# Patient Record
Sex: FEMALE | Race: BLACK OR AFRICAN AMERICAN | NOT HISPANIC OR LATINO | Employment: FULL TIME | RURAL
[De-identification: names, ages, dates, MRNs, and addresses within clinical notes are randomized per-mention and may not be internally consistent; named-entity substitution may affect disease eponyms.]

---

## 2018-07-03 ENCOUNTER — HISTORICAL (OUTPATIENT)
Dept: ADMINISTRATIVE | Facility: HOSPITAL | Age: 28
End: 2018-07-03

## 2018-07-06 LAB
LAB AP COMMENTS: NORMAL
LAB AP GENERAL CAT - HISTORICAL: NORMAL
LAB AP INTERPRETATION/RESULT - HISTORICAL: NORMAL
LAB AP SPECIMEN ADEQUACY - HISTORICAL: NORMAL
LAB AP SPECIMEN SUBMITTED - HISTORICAL: NORMAL

## 2020-12-04 ENCOUNTER — HISTORICAL (OUTPATIENT)
Dept: ADMINISTRATIVE | Facility: HOSPITAL | Age: 30
End: 2020-12-04

## 2020-12-04 LAB
HBV SURFACE AB SER-ACNC: REACTIVE M[IU]/ML
HBV SURFACE AG SERPL QL IA: NORMAL

## 2020-12-09 LAB
VARICELLA ZOSTER, BLOOD: POSITIVE
VZV IGG INDEX: >4 (ref 0–0.9)

## 2021-08-26 ENCOUNTER — HOSPITAL ENCOUNTER (EMERGENCY)
Facility: HOSPITAL | Age: 31
Discharge: HOME | End: 2021-08-26

## 2021-08-26 VITALS
DIASTOLIC BLOOD PRESSURE: 83 MMHG | TEMPERATURE: 99 F | OXYGEN SATURATION: 100 % | SYSTOLIC BLOOD PRESSURE: 164 MMHG | WEIGHT: 181 LBS | HEIGHT: 64 IN | HEART RATE: 88 BPM | BODY MASS INDEX: 30.9 KG/M2 | RESPIRATION RATE: 16 BRPM

## 2021-08-26 DIAGNOSIS — B37.0 THRUSH, ORAL: Primary | ICD-10-CM

## 2021-08-26 PROCEDURE — 99283 EMERGENCY DEPT VISIT LOW MDM: CPT

## 2021-08-26 PROCEDURE — 99283 PR EMERGENCY DEPT VISIT,LEVEL III: ICD-10-PCS | Mod: ,,, | Performed by: REGISTERED NURSE

## 2021-08-26 PROCEDURE — 99283 EMERGENCY DEPT VISIT LOW MDM: CPT | Mod: ,,, | Performed by: REGISTERED NURSE

## 2021-08-26 RX ORDER — NYSTATIN 100000 [USP'U]/ML
500000 SUSPENSION ORAL 4 TIMES DAILY
Qty: 200 ML | Refills: 0 | Status: SHIPPED | OUTPATIENT
Start: 2021-08-26 | End: 2021-09-05

## 2021-09-21 ENCOUNTER — OFFICE VISIT (OUTPATIENT)
Dept: FAMILY MEDICINE | Facility: CLINIC | Age: 31
End: 2021-09-21
Payer: COMMERCIAL

## 2021-09-21 VITALS
SYSTOLIC BLOOD PRESSURE: 120 MMHG | OXYGEN SATURATION: 98 % | HEIGHT: 64 IN | WEIGHT: 183 LBS | RESPIRATION RATE: 16 BRPM | HEART RATE: 94 BPM | TEMPERATURE: 99 F | BODY MASS INDEX: 31.24 KG/M2 | DIASTOLIC BLOOD PRESSURE: 74 MMHG

## 2021-09-21 DIAGNOSIS — M25.50 ARTHRALGIA, UNSPECIFIED JOINT: ICD-10-CM

## 2021-09-21 DIAGNOSIS — R51.9 NONINTRACTABLE HEADACHE, UNSPECIFIED CHRONICITY PATTERN, UNSPECIFIED HEADACHE TYPE: Primary | ICD-10-CM

## 2021-09-21 LAB
CRP SERPL-MCNC: <0.29 MG/DL (ref 0–0.8)
ERYTHROCYTE [SEDIMENTATION RATE] IN BLOOD BY WESTERGREN METHOD: 20 MM/HR (ref 0–20)
RHEUMATOID FACT SER NEPH-ACNC: NEGATIVE [IU]/ML

## 2021-09-21 PROCEDURE — 85651 RBC SED RATE NONAUTOMATED: CPT | Mod: ,,, | Performed by: CLINICAL MEDICAL LABORATORY

## 2021-09-21 PROCEDURE — 86038 ANA EIA W/REFLEX DSDNA/ENA: ICD-10-PCS | Mod: ,,, | Performed by: CLINICAL MEDICAL LABORATORY

## 2021-09-21 PROCEDURE — 86038 ANTINUCLEAR ANTIBODIES: CPT | Mod: ,,, | Performed by: CLINICAL MEDICAL LABORATORY

## 2021-09-21 PROCEDURE — 86430 RHEUMATOID FACTOR TEST QUAL: CPT | Mod: ,,, | Performed by: CLINICAL MEDICAL LABORATORY

## 2021-09-21 PROCEDURE — 86140 C-REACTIVE PROTEIN: CPT | Mod: ,,, | Performed by: CLINICAL MEDICAL LABORATORY

## 2021-09-21 PROCEDURE — 86430 RHEUMATOID FACTOR SCREEN: ICD-10-PCS | Mod: ,,, | Performed by: CLINICAL MEDICAL LABORATORY

## 2021-09-21 PROCEDURE — 99204 OFFICE O/P NEW MOD 45 MIN: CPT | Mod: ,,, | Performed by: FAMILY MEDICINE

## 2021-09-21 PROCEDURE — 86200 CYCLIC CITRUL PEPTIDE ANTIBODY, IGG: ICD-10-PCS | Mod: ,,, | Performed by: CLINICAL MEDICAL LABORATORY

## 2021-09-21 PROCEDURE — 86200 CCP ANTIBODY: CPT | Mod: ,,, | Performed by: CLINICAL MEDICAL LABORATORY

## 2021-09-21 PROCEDURE — 86140 C-REACTIVE PROTEIN: ICD-10-PCS | Mod: ,,, | Performed by: CLINICAL MEDICAL LABORATORY

## 2021-09-21 PROCEDURE — 99204 PR OFFICE/OUTPT VISIT, NEW, LEVL IV, 45-59 MIN: ICD-10-PCS | Mod: ,,, | Performed by: FAMILY MEDICINE

## 2021-09-21 PROCEDURE — 85651 SEDIMENTATION RATE, AUTOMATED: ICD-10-PCS | Mod: ,,, | Performed by: CLINICAL MEDICAL LABORATORY

## 2021-09-21 RX ORDER — RIZATRIPTAN BENZOATE 5 MG/1
5 TABLET ORAL
Qty: 6 TABLET | Refills: 0 | Status: SHIPPED | OUTPATIENT
Start: 2021-09-21 | End: 2021-10-27 | Stop reason: SDUPTHER

## 2021-09-23 LAB
ANA SER QL: NEGATIVE
CCP AB SER IA-ACNC: <16 UNITS

## 2021-10-27 ENCOUNTER — OFFICE VISIT (OUTPATIENT)
Dept: FAMILY MEDICINE | Facility: CLINIC | Age: 31
End: 2021-10-27
Payer: COMMERCIAL

## 2021-10-27 VITALS
SYSTOLIC BLOOD PRESSURE: 118 MMHG | WEIGHT: 181.5 LBS | DIASTOLIC BLOOD PRESSURE: 72 MMHG | HEIGHT: 64 IN | OXYGEN SATURATION: 99 % | BODY MASS INDEX: 30.99 KG/M2 | HEART RATE: 98 BPM

## 2021-10-27 DIAGNOSIS — R51.9 NONINTRACTABLE HEADACHE, UNSPECIFIED CHRONICITY PATTERN, UNSPECIFIED HEADACHE TYPE: Primary | ICD-10-CM

## 2021-10-27 PROCEDURE — 99213 PR OFFICE/OUTPT VISIT, EST, LEVL III, 20-29 MIN: ICD-10-PCS | Mod: ,,, | Performed by: FAMILY MEDICINE

## 2021-10-27 PROCEDURE — 99213 OFFICE O/P EST LOW 20 MIN: CPT | Mod: ,,, | Performed by: FAMILY MEDICINE

## 2021-10-27 RX ORDER — RIZATRIPTAN BENZOATE 5 MG/1
5 TABLET ORAL
Qty: 9 TABLET | Refills: 0 | Status: SHIPPED | OUTPATIENT
Start: 2021-10-27 | End: 2022-01-31

## 2021-11-09 ENCOUNTER — OFFICE VISIT (OUTPATIENT)
Dept: FAMILY MEDICINE | Facility: CLINIC | Age: 31
End: 2021-11-09
Payer: COMMERCIAL

## 2021-11-09 VITALS
WEIGHT: 180 LBS | HEIGHT: 64 IN | OXYGEN SATURATION: 99 % | SYSTOLIC BLOOD PRESSURE: 118 MMHG | DIASTOLIC BLOOD PRESSURE: 74 MMHG | HEART RATE: 68 BPM | BODY MASS INDEX: 30.73 KG/M2

## 2021-11-09 DIAGNOSIS — Z71.3 NUTRITIONAL COUNSELING: ICD-10-CM

## 2021-11-09 DIAGNOSIS — Z71.82 EXERCISE COUNSELING: ICD-10-CM

## 2021-11-09 DIAGNOSIS — Z00.00 ENCOUNTER FOR WELL ADULT EXAM WITHOUT ABNORMAL FINDINGS: Primary | ICD-10-CM

## 2021-11-09 DIAGNOSIS — Z12.4 CERVICAL CANCER SCREENING: ICD-10-CM

## 2021-11-09 DIAGNOSIS — Z13.1 DIABETES MELLITUS SCREENING: ICD-10-CM

## 2021-11-09 DIAGNOSIS — Z13.220 SCREENING FOR HYPERLIPIDEMIA: ICD-10-CM

## 2021-11-09 LAB
CHOLEST SERPL-MCNC: 197 MG/DL (ref 0–200)
CHOLEST/HDLC SERPL: 3.2 {RATIO}
GLUCOSE SERPL-MCNC: 99 MG/DL (ref 74–106)
HDLC SERPL-MCNC: 62 MG/DL (ref 40–60)
LDLC SERPL CALC-MCNC: 120 MG/DL
LDLC/HDLC SERPL: 1.9 {RATIO}
NONHDLC SERPL-MCNC: 135 MG/DL
TRIGL SERPL-MCNC: 73 MG/DL (ref 35–150)
VLDLC SERPL-MCNC: 15 MG/DL

## 2021-11-09 PROCEDURE — 82947 GLUCOSE, FASTING: ICD-10-PCS | Mod: ,,, | Performed by: CLINICAL MEDICAL LABORATORY

## 2021-11-09 PROCEDURE — 82947 ASSAY GLUCOSE BLOOD QUANT: CPT | Mod: ,,, | Performed by: CLINICAL MEDICAL LABORATORY

## 2021-11-09 PROCEDURE — 80061 LIPID PANEL: ICD-10-PCS | Mod: GZ,,, | Performed by: CLINICAL MEDICAL LABORATORY

## 2021-11-09 PROCEDURE — 80061 LIPID PANEL: CPT | Mod: GZ,,, | Performed by: CLINICAL MEDICAL LABORATORY

## 2021-11-09 PROCEDURE — 99395 PREV VISIT EST AGE 18-39: CPT | Mod: ,,, | Performed by: FAMILY MEDICINE

## 2021-11-09 PROCEDURE — 99395 PR PREVENTIVE VISIT,EST,18-39: ICD-10-PCS | Mod: ,,, | Performed by: FAMILY MEDICINE

## 2021-11-12 ENCOUNTER — PATIENT MESSAGE (OUTPATIENT)
Dept: FAMILY MEDICINE | Facility: CLINIC | Age: 31
End: 2021-11-12
Payer: COMMERCIAL

## 2021-11-17 DIAGNOSIS — Z11.52 ENCOUNTER FOR SCREENING LABORATORY TESTING FOR COVID-19 VIRUS: Primary | ICD-10-CM

## 2021-12-03 ENCOUNTER — OFFICE VISIT (OUTPATIENT)
Dept: OBSTETRICS AND GYNECOLOGY | Facility: CLINIC | Age: 31
End: 2021-12-03
Payer: COMMERCIAL

## 2021-12-03 VITALS
WEIGHT: 180.81 LBS | HEIGHT: 64 IN | SYSTOLIC BLOOD PRESSURE: 143 MMHG | BODY MASS INDEX: 30.87 KG/M2 | DIASTOLIC BLOOD PRESSURE: 84 MMHG | HEART RATE: 90 BPM | RESPIRATION RATE: 18 BRPM | OXYGEN SATURATION: 97 % | TEMPERATURE: 98 F

## 2021-12-03 DIAGNOSIS — Z72.51 HIGH RISK SEXUAL BEHAVIOR, UNSPECIFIED TYPE: ICD-10-CM

## 2021-12-03 DIAGNOSIS — Z01.419 ENCOUNTER FOR ANNUAL ROUTINE GYNECOLOGICAL EXAMINATION: Primary | ICD-10-CM

## 2021-12-03 DIAGNOSIS — Z32.02 URINE PREGNANCY TEST NEGATIVE: ICD-10-CM

## 2021-12-03 DIAGNOSIS — Z30.42 SURVEILLANCE FOR DEPO-PROVERA CONTRACEPTION: ICD-10-CM

## 2021-12-03 LAB
B-HCG UR QL: NEGATIVE
CANDIDA SPECIES: NEGATIVE
CTP QC/QA: YES
GARDNERELLA: POSITIVE
TRICHOMONAS: NEGATIVE

## 2021-12-03 PROCEDURE — 87491 CHLMYD TRACH DNA AMP PROBE: CPT | Mod: ,,, | Performed by: CLINICAL MEDICAL LABORATORY

## 2021-12-03 PROCEDURE — 87480 CANDIDA DNA DIR PROBE: CPT | Mod: ,,, | Performed by: CLINICAL MEDICAL LABORATORY

## 2021-12-03 PROCEDURE — 87591 N.GONORRHOEAE DNA AMP PROB: CPT | Mod: ,,, | Performed by: CLINICAL MEDICAL LABORATORY

## 2021-12-03 PROCEDURE — 99395 PR PREVENTIVE VISIT,EST,18-39: ICD-10-PCS | Mod: 25,,, | Performed by: ADVANCED PRACTICE MIDWIFE

## 2021-12-03 PROCEDURE — 81025 URINE PREGNANCY TEST: CPT | Mod: QW,,, | Performed by: ADVANCED PRACTICE MIDWIFE

## 2021-12-03 PROCEDURE — 96372 PR INJECTION,THERAP/PROPH/DIAG2ST, IM OR SUBCUT: ICD-10-PCS | Mod: ,,, | Performed by: ADVANCED PRACTICE MIDWIFE

## 2021-12-03 PROCEDURE — 87624 HPV HI-RISK TYP POOLED RSLT: CPT | Mod: ,,, | Performed by: CLINICAL MEDICAL LABORATORY

## 2021-12-03 PROCEDURE — 87660 BACTERIAL VAGINOSIS: ICD-10-PCS | Mod: ,,, | Performed by: CLINICAL MEDICAL LABORATORY

## 2021-12-03 PROCEDURE — 87591 CHLAMYDIA/GONORRHOEAE(GC), PCR: ICD-10-PCS | Mod: ,,, | Performed by: CLINICAL MEDICAL LABORATORY

## 2021-12-03 PROCEDURE — 87510 GARDNER VAG DNA DIR PROBE: CPT | Mod: ,,, | Performed by: CLINICAL MEDICAL LABORATORY

## 2021-12-03 PROCEDURE — 87624 HUMAN PAPILLOMAVIRUS (HPV): ICD-10-PCS | Mod: ,,, | Performed by: CLINICAL MEDICAL LABORATORY

## 2021-12-03 PROCEDURE — 87660 TRICHOMONAS VAGIN DIR PROBE: CPT | Mod: ,,, | Performed by: CLINICAL MEDICAL LABORATORY

## 2021-12-03 PROCEDURE — 96372 THER/PROPH/DIAG INJ SC/IM: CPT | Mod: ,,, | Performed by: ADVANCED PRACTICE MIDWIFE

## 2021-12-03 PROCEDURE — 81025 POCT URINE PREGNANCY: ICD-10-PCS | Mod: QW,,, | Performed by: ADVANCED PRACTICE MIDWIFE

## 2021-12-03 PROCEDURE — 87510 BACTERIAL VAGINOSIS: ICD-10-PCS | Mod: ,,, | Performed by: CLINICAL MEDICAL LABORATORY

## 2021-12-03 PROCEDURE — 87491 CHLAMYDIA/GONORRHOEAE(GC), PCR: ICD-10-PCS | Mod: ,,, | Performed by: CLINICAL MEDICAL LABORATORY

## 2021-12-03 PROCEDURE — 87480 BACTERIAL VAGINOSIS: ICD-10-PCS | Mod: ,,, | Performed by: CLINICAL MEDICAL LABORATORY

## 2021-12-03 PROCEDURE — 88142 CYTOPATH C/V THIN LAYER: CPT | Mod: GCY | Performed by: ADVANCED PRACTICE MIDWIFE

## 2021-12-03 PROCEDURE — 99395 PREV VISIT EST AGE 18-39: CPT | Mod: 25,,, | Performed by: ADVANCED PRACTICE MIDWIFE

## 2021-12-03 RX ORDER — MEDROXYPROGESTERONE ACETATE 150 MG/ML
150 INJECTION, SUSPENSION INTRAMUSCULAR
Status: COMPLETED | OUTPATIENT
Start: 2021-12-03 | End: 2021-12-03

## 2021-12-03 RX ADMIN — MEDROXYPROGESTERONE ACETATE 150 MG: 150 INJECTION, SUSPENSION INTRAMUSCULAR at 10:12

## 2021-12-05 LAB
CHLAMYDIA BY PCR: NEGATIVE
N. GONORRHOEAE (GC) BY PCR: NEGATIVE

## 2021-12-06 ENCOUNTER — TELEPHONE (OUTPATIENT)
Dept: OBSTETRICS AND GYNECOLOGY | Facility: CLINIC | Age: 31
End: 2021-12-06
Payer: COMMERCIAL

## 2021-12-06 DIAGNOSIS — B96.89 BACTERIAL VAGINOSIS: Primary | ICD-10-CM

## 2021-12-06 DIAGNOSIS — N76.0 BACTERIAL VAGINOSIS: Primary | ICD-10-CM

## 2021-12-06 RX ORDER — METRONIDAZOLE 500 MG/1
500 TABLET ORAL 2 TIMES DAILY
Qty: 14 TABLET | Refills: 0 | Status: SHIPPED | OUTPATIENT
Start: 2021-12-06 | End: 2021-12-13

## 2021-12-08 LAB
GH SERPL-MCNC: NORMAL NG/ML
INSULIN SERPL-ACNC: NORMAL U[IU]/ML
LAB AP CLINICAL INFORMATION: NORMAL
LAB AP GYN INTERPRETATION: NEGATIVE
LAB AP PAP DISCLAIMER COMMENTS: NORMAL
RENIN PLAS-CCNC: NORMAL NG/ML/H

## 2021-12-09 LAB
HPV 16: NEGATIVE
HPV 18: NEGATIVE
HPV OTHER: NEGATIVE

## 2021-12-10 ENCOUNTER — TELEPHONE (OUTPATIENT)
Dept: OBSTETRICS AND GYNECOLOGY | Facility: CLINIC | Age: 31
End: 2021-12-10
Payer: COMMERCIAL

## 2021-12-15 ENCOUNTER — LAB VISIT (OUTPATIENT)
Dept: PRIMARY CARE CLINIC | Facility: CLINIC | Age: 31
End: 2021-12-15

## 2021-12-15 DIAGNOSIS — Z20.822 COVID-19 RULED OUT: Primary | ICD-10-CM

## 2021-12-15 LAB
FLUAV AG UPPER RESP QL IA.RAPID: NEGATIVE
FLUBV AG UPPER RESP QL IA.RAPID: POSITIVE
SARS-COV+SARS-COV-2 AG RESP QL IA.RAPID: NEGATIVE

## 2021-12-15 PROCEDURE — 87428 SARSCOV & INF VIR A&B AG IA: CPT | Performed by: NURSE PRACTITIONER

## 2022-01-31 ENCOUNTER — OFFICE VISIT (OUTPATIENT)
Dept: NEUROLOGY | Facility: CLINIC | Age: 32
End: 2022-01-31
Payer: COMMERCIAL

## 2022-01-31 VITALS
DIASTOLIC BLOOD PRESSURE: 68 MMHG | HEIGHT: 64 IN | BODY MASS INDEX: 30.73 KG/M2 | HEART RATE: 95 BPM | RESPIRATION RATE: 18 BRPM | SYSTOLIC BLOOD PRESSURE: 110 MMHG | OXYGEN SATURATION: 99 % | WEIGHT: 180 LBS

## 2022-01-31 DIAGNOSIS — R51.9 NONINTRACTABLE HEADACHE, UNSPECIFIED CHRONICITY PATTERN, UNSPECIFIED HEADACHE TYPE: ICD-10-CM

## 2022-01-31 DIAGNOSIS — G44.1 OTHER VASCULAR HEADACHE: Primary | ICD-10-CM

## 2022-01-31 DIAGNOSIS — E53.8 VITAMIN B12 DEFICIENCY: ICD-10-CM

## 2022-01-31 DIAGNOSIS — E55.9 VITAMIN D DEFICIENCY: ICD-10-CM

## 2022-01-31 DIAGNOSIS — E03.9 HYPOTHYROIDISM, UNSPECIFIED TYPE: ICD-10-CM

## 2022-01-31 PROCEDURE — 3074F SYST BP LT 130 MM HG: CPT | Mod: CPTII,,, | Performed by: NURSE PRACTITIONER

## 2022-01-31 PROCEDURE — 3078F PR MOST RECENT DIASTOLIC BLOOD PRESSURE < 80 MM HG: ICD-10-PCS | Mod: CPTII,,, | Performed by: NURSE PRACTITIONER

## 2022-01-31 PROCEDURE — 99215 OFFICE O/P EST HI 40 MIN: CPT | Mod: PBBFAC | Performed by: NURSE PRACTITIONER

## 2022-01-31 PROCEDURE — 3078F DIAST BP <80 MM HG: CPT | Mod: CPTII,,, | Performed by: NURSE PRACTITIONER

## 2022-01-31 PROCEDURE — 1159F MED LIST DOCD IN RCRD: CPT | Mod: CPTII,,, | Performed by: NURSE PRACTITIONER

## 2022-01-31 PROCEDURE — 3008F PR BODY MASS INDEX (BMI) DOCUMENTED: ICD-10-PCS | Mod: CPTII,,, | Performed by: NURSE PRACTITIONER

## 2022-01-31 PROCEDURE — 99204 OFFICE O/P NEW MOD 45 MIN: CPT | Mod: S$PBB,,, | Performed by: NURSE PRACTITIONER

## 2022-01-31 PROCEDURE — 3074F PR MOST RECENT SYSTOLIC BLOOD PRESSURE < 130 MM HG: ICD-10-PCS | Mod: CPTII,,, | Performed by: NURSE PRACTITIONER

## 2022-01-31 PROCEDURE — 1160F PR REVIEW ALL MEDS BY PRESCRIBER/CLIN PHARMACIST DOCUMENTED: ICD-10-PCS | Mod: CPTII,,, | Performed by: NURSE PRACTITIONER

## 2022-01-31 PROCEDURE — 1160F RVW MEDS BY RX/DR IN RCRD: CPT | Mod: CPTII,,, | Performed by: NURSE PRACTITIONER

## 2022-01-31 PROCEDURE — 99204 PR OFFICE/OUTPT VISIT, NEW, LEVL IV, 45-59 MIN: ICD-10-PCS | Mod: S$PBB,,, | Performed by: NURSE PRACTITIONER

## 2022-01-31 PROCEDURE — 1159F PR MEDICATION LIST DOCUMENTED IN MEDICAL RECORD: ICD-10-PCS | Mod: CPTII,,, | Performed by: NURSE PRACTITIONER

## 2022-01-31 PROCEDURE — 3008F BODY MASS INDEX DOCD: CPT | Mod: CPTII,,, | Performed by: NURSE PRACTITIONER

## 2022-01-31 RX ORDER — PROMETHAZINE HYDROCHLORIDE 25 MG/1
TABLET ORAL
Qty: 10 TABLET | Refills: 3 | Status: SHIPPED | OUTPATIENT
Start: 2022-01-31 | End: 2023-06-27

## 2022-01-31 RX ORDER — RIZATRIPTAN BENZOATE 10 MG/1
10 TABLET ORAL
Qty: 10 TABLET | Refills: 1 | Status: SHIPPED | OUTPATIENT
Start: 2022-01-31 | End: 2023-06-27

## 2022-01-31 NOTE — PROGRESS NOTES
Subjective:       Patient ID: Taisha Hunter is a 31 y.o. female     Chief Complaint:    Chief Complaint   Patient presents with    Referral    Headache        Allergies:  Patient has no known allergies.    Current Medications:    Outpatient Encounter Medications as of 1/31/2022   Medication Sig Dispense Refill    promethazine (PHENERGAN) 25 MG tablet Take 1 tablet every 8 hours as needed for migraine.  Not more than 3 days of the week 10 tablet 3    rizatriptan (MAXALT) 10 MG tablet Take 1 tablet (10 mg total) by mouth as needed for Migraine. 10 tablet 1    [DISCONTINUED] rizatriptan (MAXALT) 5 MG tablet Take 1 tablet (5 mg total) by mouth as needed for Migraine. 9 tablet 0     No facility-administered encounter medications on file as of 1/31/2022.       History of Present Illness  30 y/o female new referral from Dr. Limon for reported headaches    Patient reports onset several months ago, likely before she had covid in November 2021. Denies prior history of headaches and no immediate family history of headaches.  She reports them about 15 days of the month, usually unilateral, usually feeling of pain behind the eyes with mild vision changes.  Denies n/v, but does admit to photophobia and phonophobia.  Does follow with opthamology on [place], last seen last year.  She is prescribed glasses, but does not wear them daily and openly admits to this.  She is recently prescribed maxalt 5mg to use PRN by primary care which she reports helps, but does not eliminate the headaches.  Feels sometimes bright lights can trigger or worsen the headaches.  No other triggers she can recall.  Denies overuse headaches.  No weakness or numbness reported.         Review of Systems  Review of Systems   Constitutional: Negative for diaphoresis and fever.   HENT: Negative for congestion, hearing loss and tinnitus.    Eyes: Positive for blurred vision, photophobia and pain. Negative for double vision, discharge and redness.    Respiratory: Negative for cough and shortness of breath.    Cardiovascular: Negative for chest pain.   Gastrointestinal: Negative for abdominal pain, nausea and vomiting.   Musculoskeletal: Negative for back pain, joint pain, myalgias and neck pain.   Skin: Negative for itching and rash.   Neurological: Positive for headaches. Negative for dizziness, tremors, sensory change, speech change, focal weakness, seizures, loss of consciousness and weakness.   Psychiatric/Behavioral: Negative for depression, hallucinations and memory loss. The patient does not have insomnia.    All other systems reviewed and are negative.     Objective:     NEUROLOGICAL EXAMINATION:     MENTAL STATUS   Oriented to person, place, and time.   Attention: normal. Concentration: normal.   Speech: speech is normal   Level of consciousness: alert  Knowledge: good and consistent with education.   Normal comprehension.     CRANIAL NERVES     CN II   Visual fields full to confrontation.   Visual acuity: normal  Right visual field deficit: none  Left visual field deficit: none     CN III, IV, VI   Pupils are equal, round, and reactive to light.  Extraocular motions are normal.   Right pupil: Size: 3 mm. Shape: regular. Reactivity: brisk. Consensual response: intact. Accommodation: intact.   Left pupil: Size: 3 mm. Shape: regular. Reactivity: brisk. Consensual response: intact. Accommodation: intact.   CN III: no CN III palsy  CN VI: no CN VI palsy  Nystagmus: none   Diplopia: none  Upgaze: normal  Downgaze: normal  Conjugate gaze: present  Vestibulo-ocular reflex: present    CN V   Facial sensation intact.   Right facial sensation deficit: none  Left facial sensation deficit: none  Right corneal reflex: normal  Left corneal reflex: normal    CN VII   Facial expression full, symmetric.   Right facial weakness: none  Left facial weakness: none  Right taste: normal  Left taste: normal    CN VIII   CN VIII normal.   Hearing: intact    CN IX, X   CN IX  normal.   CN X normal.   Palate: symmetric    CN XI   CN XI normal.   Right sternocleidomastoid strength: normal  Left sternocleidomastoid strength: normal  Right trapezius strength: normal  Left trapezius strength: normal    CN XII   CN XII normal.   Tongue: not atrophic  Fasciculations: absent  Tongue deviation: none    MOTOR EXAM   Muscle bulk: normal  Overall muscle tone: normal  Right arm tone: normal  Left arm tone: normal  Right arm pronator drift: absent  Left arm pronator drift: absent    Strength   Right neck flexion: 5/5  Left neck flexion: 5/5  Right neck extension: 5/5  Left neck extension: 5/5  Right deltoid: 5/5  Left deltoid: 5/5  Right biceps: 5/5  Left biceps: 5/5  Right triceps: 5/5  Left triceps: 5/5  Right wrist flexion: 5/5  Left wrist flexion: 5/5  Right wrist extension: 5/5  Left wrist extension: 5/5  Right interossei: 5/5  Left interossei: 5/5    REFLEXES     Reflexes   Right brachioradialis: 2+  Left brachioradialis: 2+  Right biceps: 2+  Left biceps: 2+  Right triceps: 2+  Left triceps: 2+  Right patellar: 2+  Left patellar: 2+  Right achilles: 2+  Left achilles: 2+  Right : 2+  Left : 2+  Right plantar: normal  Left plantar: normal    SENSORY EXAM   Light touch normal.   Right arm light touch: normal  Left arm light touch: normal  Right leg light touch: normal  Left leg light touch: normal  Vibration normal.   Right arm vibration: normal  Left arm vibration: normal  Right leg vibration: normal  Left leg vibration: normal    GAIT AND COORDINATION     Gait  Gait: normal     Coordination   Finger to nose coordination: normal  Heel to shin coordination: normal  Tandem walking coordination: normal    Tremor   Resting tremor: absent  Intention tremor: absent  Action tremor: absent       Physical Exam  Vitals and nursing note reviewed.   Constitutional:       Appearance: Normal appearance.   HENT:      Head: Normocephalic.   Eyes:      Extraocular Movements: Extraocular movements intact  and EOM normal.      Pupils: Pupils are equal, round, and reactive to light.   Cardiovascular:      Rate and Rhythm: Normal rate and regular rhythm.   Pulmonary:      Effort: Pulmonary effort is normal.      Breath sounds: Normal breath sounds.   Musculoskeletal:         General: No swelling or tenderness. Normal range of motion.      Cervical back: Normal range of motion and neck supple.      Right lower leg: No edema.      Left lower leg: No edema.   Skin:     General: Skin is warm and dry.      Coloration: Skin is not jaundiced.      Findings: No rash.   Neurological:      General: No focal deficit present.      Mental Status: She is alert and oriented to person, place, and time.      GCS: GCS eye subscore is 4. GCS verbal subscore is 5. GCS motor subscore is 6.      Cranial Nerves: No cranial nerve deficit.      Sensory: No sensory deficit.      Motor: Motor function is intact. No weakness.      Coordination: Coordination is intact. Coordination normal. Finger-Nose-Finger Test and Heel to Shin Test normal.      Gait: Gait is intact. Gait and tandem walk normal.      Deep Tendon Reflexes: Reflexes normal.      Reflex Scores:       Tricep reflexes are 2+ on the right side and 2+ on the left side.       Bicep reflexes are 2+ on the right side and 2+ on the left side.       Brachioradialis reflexes are 2+ on the right side and 2+ on the left side.       Patellar reflexes are 2+ on the right side and 2+ on the left side.       Achilles reflexes are 2+ on the right side and 2+ on the left side.  Psychiatric:         Mood and Affect: Mood normal.         Speech: Speech normal.         Behavior: Behavior normal.          Assessment:     Problem List Items Addressed This Visit        Neuro    Nonintractable headache    Relevant Medications    promethazine (PHENERGAN) 25 MG tablet    rizatriptan (MAXALT) 10 MG tablet    Other Relevant Orders    Basic Metabolic Panel    CBC Auto Differential      Other Visit Diagnoses      Other vascular headache    -  Primary    Relevant Orders    MRI Brain W WO Contrast    Vitamin B12 deficiency        Relevant Orders    Folate    Vitamin B12    Vitamin D deficiency        Relevant Orders    Vitamin D    Hypothyroidism, unspecified type        Relevant Orders    TSH           Primary Diagnosis and ICD10  Other vascular headache [G44.1]    Plan:     Patient Instructions   1. Labs as ordered    2. MRI brain with and without    3. Increase the Maxalt to 10mg as needed for the migraine headache, not to exceed 3 days of the week    4. Phenergan as needed for headache, also not more than 3 days of the week      Medications Discontinued During This Encounter   Medication Reason    rizatriptan (MAXALT) 5 MG tablet        Requested Prescriptions     Signed Prescriptions Disp Refills    promethazine (PHENERGAN) 25 MG tablet 10 tablet 3     Sig: Take 1 tablet every 8 hours as needed for migraine.  Not more than 3 days of the week    rizatriptan (MAXALT) 10 MG tablet 10 tablet 1     Sig: Take 1 tablet (10 mg total) by mouth as needed for Migraine.       Orders Placed This Encounter   Procedures    MRI Brain W WO Contrast    Basic Metabolic Panel    CBC Auto Differential    Folate    Vitamin D    Vitamin B12    TSH

## 2022-01-31 NOTE — PATIENT INSTRUCTIONS
1. Labs as ordered    2. MRI brain with and without    3. Increase the Maxalt to 10mg as needed for the migraine headache, not to exceed 3 days of the week    4. Phenergan as needed for headache, also not more than 3 days of the week

## 2022-02-01 ENCOUNTER — TELEPHONE (OUTPATIENT)
Dept: NEUROLOGY | Facility: CLINIC | Age: 32
End: 2022-02-01
Payer: COMMERCIAL

## 2022-02-01 DIAGNOSIS — E55.9 VITAMIN D DEFICIENCY: Primary | ICD-10-CM

## 2022-02-01 RX ORDER — ERGOCALCIFEROL 1.25 MG/1
CAPSULE ORAL
Qty: 4 CAPSULE | Refills: 5 | Status: SHIPPED | OUTPATIENT
Start: 2022-02-01 | End: 2022-10-18

## 2022-02-01 NOTE — TELEPHONE ENCOUNTER
----- Message from AVA Olmstead sent at 2/1/2022  1:10 PM CST -----  Her vitamin D is actually quite low.  I am sending her a prescription for high dose supplement to take as directed

## 2022-02-18 ENCOUNTER — OFFICE VISIT (OUTPATIENT)
Dept: OBSTETRICS AND GYNECOLOGY | Facility: CLINIC | Age: 32
End: 2022-02-18
Payer: COMMERCIAL

## 2022-02-18 VITALS
SYSTOLIC BLOOD PRESSURE: 137 MMHG | HEART RATE: 96 BPM | DIASTOLIC BLOOD PRESSURE: 82 MMHG | BODY MASS INDEX: 31.82 KG/M2 | WEIGHT: 186.38 LBS | HEIGHT: 64 IN | RESPIRATION RATE: 17 BRPM | OXYGEN SATURATION: 99 %

## 2022-02-18 DIAGNOSIS — Z30.42 SURVEILLANCE OF CONTRACEPTIVE INJECTION: Primary | ICD-10-CM

## 2022-02-18 PROCEDURE — 96372 THER/PROPH/DIAG INJ SC/IM: CPT | Mod: ,,, | Performed by: ADVANCED PRACTICE MIDWIFE

## 2022-02-18 PROCEDURE — 3079F DIAST BP 80-89 MM HG: CPT | Mod: CPTII,,, | Performed by: ADVANCED PRACTICE MIDWIFE

## 2022-02-18 PROCEDURE — 3075F PR MOST RECENT SYSTOLIC BLOOD PRESS GE 130-139MM HG: ICD-10-PCS | Mod: CPTII,,, | Performed by: ADVANCED PRACTICE MIDWIFE

## 2022-02-18 PROCEDURE — 1159F MED LIST DOCD IN RCRD: CPT | Mod: CPTII,,, | Performed by: ADVANCED PRACTICE MIDWIFE

## 2022-02-18 PROCEDURE — 96372 PR INJECTION,THERAP/PROPH/DIAG2ST, IM OR SUBCUT: ICD-10-PCS | Mod: ,,, | Performed by: ADVANCED PRACTICE MIDWIFE

## 2022-02-18 PROCEDURE — 99212 OFFICE O/P EST SF 10 MIN: CPT | Mod: 25,,, | Performed by: ADVANCED PRACTICE MIDWIFE

## 2022-02-18 PROCEDURE — 3008F BODY MASS INDEX DOCD: CPT | Mod: CPTII,,, | Performed by: ADVANCED PRACTICE MIDWIFE

## 2022-02-18 PROCEDURE — 3008F PR BODY MASS INDEX (BMI) DOCUMENTED: ICD-10-PCS | Mod: CPTII,,, | Performed by: ADVANCED PRACTICE MIDWIFE

## 2022-02-18 PROCEDURE — 3075F SYST BP GE 130 - 139MM HG: CPT | Mod: CPTII,,, | Performed by: ADVANCED PRACTICE MIDWIFE

## 2022-02-18 PROCEDURE — 1159F PR MEDICATION LIST DOCUMENTED IN MEDICAL RECORD: ICD-10-PCS | Mod: CPTII,,, | Performed by: ADVANCED PRACTICE MIDWIFE

## 2022-02-18 PROCEDURE — 3079F PR MOST RECENT DIASTOLIC BLOOD PRESSURE 80-89 MM HG: ICD-10-PCS | Mod: CPTII,,, | Performed by: ADVANCED PRACTICE MIDWIFE

## 2022-02-18 PROCEDURE — 99212 PR OFFICE/OUTPT VISIT, EST, LEVL II, 10-19 MIN: ICD-10-PCS | Mod: 25,,, | Performed by: ADVANCED PRACTICE MIDWIFE

## 2022-02-18 RX ORDER — MEDROXYPROGESTERONE ACETATE 150 MG/ML
150 INJECTION, SUSPENSION INTRAMUSCULAR
Status: COMPLETED | OUTPATIENT
Start: 2022-02-18 | End: 2022-02-18

## 2022-02-18 RX ADMIN — MEDROXYPROGESTERONE ACETATE 150 MG: 150 INJECTION, SUSPENSION INTRAMUSCULAR at 08:02

## 2022-02-18 NOTE — PROGRESS NOTES
"  Patient ID:  Taisha Hunter is a 31 y.o. female      Chief Complaint:   Chief Complaint   Patient presents with    Contraception     Depo, within dates        HPI:  Taisha is in for repeat depo injection within dates. She denies ACHES, vag discharge, and vag bleeding. She desires to continue injections  LMP: no regular cycles, has spotting occasionally  Sexually active: yes, uses condoms, declines STD testing      Past Medical History:   Diagnosis Date    COVID-19     Headache      No past surgical history on file.    OB History        3    Para   3    Term                AB        Living           SAB        IAB        Ectopic        Multiple        Live Births                     /82 (BP Location: Right arm)   Pulse 96   Resp 17   Ht 5' 4" (1.626 m)   Wt 84.6 kg (186 lb 6.4 oz)   SpO2 99%   BMI 32.00 kg/m²   Wt Readings from Last 3 Encounters:   22 84.6 kg (186 lb 6.4 oz)   22 81.6 kg (180 lb)   21 82 kg (180 lb 12.8 oz)          ROS:  Review of Systems   Constitutional: Negative.    Eyes: Negative.    Respiratory: Negative.    Cardiovascular: Negative.    Gastrointestinal: Negative.    Genitourinary: Negative.    Musculoskeletal: Negative.    Neurological: Negative.    Psychiatric/Behavioral: Negative.           PHYSICAL EXAM:  Physical Exam  Constitutional:       Appearance: Normal appearance.   Eyes:      Extraocular Movements: Extraocular movements intact.   Cardiovascular:      Rate and Rhythm: Normal rate.      Pulses: Normal pulses.   Pulmonary:      Effort: Pulmonary effort is normal.   Abdominal:      Palpations: Abdomen is soft.   Musculoskeletal:         General: Normal range of motion.      Cervical back: Normal range of motion.   Skin:     General: Skin is warm and dry.   Neurological:      Mental Status: She is alert and oriented to person, place, and time.   Psychiatric:         Mood and Affect: Mood normal.         Behavior: Behavior normal.       " "  Thought Content: Thought content normal.         Judgment: Judgment normal.          Assessment:  Surveillance of contraceptive injection  -     medroxyPROGESTERone (DEPO-PROVERA) injection 150 mg    BMI 32.0-32.9,adult          ICD-10-CM ICD-9-CM    1. Surveillance of contraceptive injection  Z30.42 V25.49 medroxyPROGESTERone (DEPO-PROVERA) injection 150 mg   2. BMI 32.0-32.9,adult  Z68.32 V85.32        Plan:  Depo Provera 150 mg IM given, Reviewed "ACHES" of contraceptives and possible S/E  Encouraged Vitamin D and calcium supplements  Instructed on condom use during each sexual encounter     Follow up in about 3 months (around 5/18/2022) for repeat depo injection.                      "

## 2022-02-28 ENCOUNTER — OFFICE VISIT (OUTPATIENT)
Dept: OBSTETRICS AND GYNECOLOGY | Facility: CLINIC | Age: 32
End: 2022-02-28
Payer: COMMERCIAL

## 2022-02-28 ENCOUNTER — TELEPHONE (OUTPATIENT)
Dept: OBSTETRICS AND GYNECOLOGY | Facility: CLINIC | Age: 32
End: 2022-02-28
Payer: COMMERCIAL

## 2022-02-28 VITALS
RESPIRATION RATE: 17 BRPM | HEIGHT: 64 IN | HEART RATE: 93 BPM | SYSTOLIC BLOOD PRESSURE: 146 MMHG | DIASTOLIC BLOOD PRESSURE: 85 MMHG | WEIGHT: 189.19 LBS | BODY MASS INDEX: 32.3 KG/M2 | OXYGEN SATURATION: 99 %

## 2022-02-28 DIAGNOSIS — R82.90 ABNORMAL URINE ODOR: Primary | ICD-10-CM

## 2022-02-28 DIAGNOSIS — N39.0 URINARY TRACT INFECTION WITHOUT HEMATURIA, SITE UNSPECIFIED: ICD-10-CM

## 2022-02-28 LAB
BILIRUB SERPL-MCNC: NEGATIVE MG/DL
BLOOD URINE, POC: NEGATIVE
COLOR, POC UA: YELLOW
GLUCOSE UR QL STRIP: NEGATIVE
KETONES UR QL STRIP: NEGATIVE
LEUKOCYTE ESTERASE URINE, POC: ABNORMAL
NITRITE, POC UA: POSITIVE
PH, POC UA: 7
PROTEIN, POC: NEGATIVE
SPECIFIC GRAVITY, POC UA: 1.02
UROBILINOGEN, POC UA: 0.2

## 2022-02-28 PROCEDURE — 3008F BODY MASS INDEX DOCD: CPT | Mod: CPTII,,, | Performed by: ADVANCED PRACTICE MIDWIFE

## 2022-02-28 PROCEDURE — 81003 URINALYSIS AUTO W/O SCOPE: CPT | Mod: QW,,, | Performed by: ADVANCED PRACTICE MIDWIFE

## 2022-02-28 PROCEDURE — 1159F MED LIST DOCD IN RCRD: CPT | Mod: CPTII,,, | Performed by: ADVANCED PRACTICE MIDWIFE

## 2022-02-28 PROCEDURE — 3008F PR BODY MASS INDEX (BMI) DOCUMENTED: ICD-10-PCS | Mod: CPTII,,, | Performed by: ADVANCED PRACTICE MIDWIFE

## 2022-02-28 PROCEDURE — 99212 OFFICE O/P EST SF 10 MIN: CPT | Mod: ,,, | Performed by: ADVANCED PRACTICE MIDWIFE

## 2022-02-28 PROCEDURE — 1159F PR MEDICATION LIST DOCUMENTED IN MEDICAL RECORD: ICD-10-PCS | Mod: CPTII,,, | Performed by: ADVANCED PRACTICE MIDWIFE

## 2022-02-28 PROCEDURE — 3077F SYST BP >= 140 MM HG: CPT | Mod: CPTII,,, | Performed by: ADVANCED PRACTICE MIDWIFE

## 2022-02-28 PROCEDURE — 3077F PR MOST RECENT SYSTOLIC BLOOD PRESSURE >= 140 MM HG: ICD-10-PCS | Mod: CPTII,,, | Performed by: ADVANCED PRACTICE MIDWIFE

## 2022-02-28 PROCEDURE — 3079F DIAST BP 80-89 MM HG: CPT | Mod: CPTII,,, | Performed by: ADVANCED PRACTICE MIDWIFE

## 2022-02-28 PROCEDURE — 3079F PR MOST RECENT DIASTOLIC BLOOD PRESSURE 80-89 MM HG: ICD-10-PCS | Mod: CPTII,,, | Performed by: ADVANCED PRACTICE MIDWIFE

## 2022-02-28 PROCEDURE — 99212 PR OFFICE/OUTPT VISIT, EST, LEVL II, 10-19 MIN: ICD-10-PCS | Mod: ,,, | Performed by: ADVANCED PRACTICE MIDWIFE

## 2022-02-28 PROCEDURE — 81003 POCT URINALYSIS W/O SCOPE: ICD-10-PCS | Mod: QW,,, | Performed by: ADVANCED PRACTICE MIDWIFE

## 2022-02-28 RX ORDER — NITROFURANTOIN 25; 75 MG/1; MG/1
100 CAPSULE ORAL 2 TIMES DAILY
Qty: 14 CAPSULE | Refills: 0 | Status: SHIPPED | OUTPATIENT
Start: 2022-02-28 | End: 2022-03-07

## 2022-02-28 NOTE — PROGRESS NOTES
"Patient ID:  Taisha Hunter is a 31 y.o. female      Chief Complaint:   Chief Complaint   Patient presents with    Strong urine        HPI:  Taisha is in with c/o strong odor to urine. Denies dysuria, hematuria, and vag discharge  LMP: no regular cycles   Sexually active:  yes  Contraceptive: depo injections      Past Medical History:   Diagnosis Date    COVID-19     Headache      No past surgical history on file.    OB History        3    Para   3    Term                AB        Living           SAB        IAB        Ectopic        Multiple        Live Births                     BP (!) 146/85 (BP Location: Right arm)   Pulse 93   Resp 17   Ht 5' 4" (1.626 m)   Wt 85.8 kg (189 lb 3.2 oz)   SpO2 99%   BMI 32.48 kg/m²   Wt Readings from Last 3 Encounters:   22 85.8 kg (189 lb 3.2 oz)   22 84.6 kg (186 lb 6.4 oz)   22 81.6 kg (180 lb)          ROS:  Review of Systems   Constitutional: Negative.    Eyes: Negative.    Respiratory: Negative.    Cardiovascular: Negative.    Gastrointestinal: Negative.    Genitourinary: Negative.  Negative for dysuria, flank pain, frequency and hematuria.        Reports strong odor to urine   Musculoskeletal: Negative.    Neurological: Negative.    Psychiatric/Behavioral: Negative.           PHYSICAL EXAM:  Physical Exam  Constitutional:       Appearance: Normal appearance.   Eyes:      Extraocular Movements: Extraocular movements intact.   Cardiovascular:      Rate and Rhythm: Normal rate.      Pulses: Normal pulses.   Pulmonary:      Effort: Pulmonary effort is normal.   Abdominal:      Palpations: Abdomen is soft.   Musculoskeletal:         General: Normal range of motion.      Cervical back: Normal range of motion.   Skin:     General: Skin is warm and dry.   Neurological:      Mental Status: She is alert and oriented to person, place, and time.   Psychiatric:         Mood and Affect: Mood normal.         Behavior: Behavior normal.         " Thought Content: Thought content normal.         Judgment: Judgment normal.          Assessment:  Abnormal urine odor  -     POCT URINALYSIS W/O SCOPE    Urinary tract infection without hematuria, site unspecified  -     Urine culture; Future; Expected date: 02/28/2022  -     nitrofurantoin, macrocrystal-monohydrate, (MACROBID) 100 MG capsule; Take 1 capsule (100 mg total) by mouth 2 (two) times daily. for 7 days  Dispense: 14 capsule; Refill: 0          ICD-10-CM ICD-9-CM    1. Abnormal urine odor  R82.90 791.9 POCT URINALYSIS W/O SCOPE   2. Urinary tract infection without hematuria, site unspecified  N39.0 599.0 Urine culture      nitrofurantoin, macrocrystal-monohydrate, (MACROBID) 100 MG capsule       Plan:  U/A nitrate pos, small amt WBCs, culture ordered  Macrobid rx sent for tx of UTI  Will call with results of urine culture   Encouraged increased water intake, occasional cranberry juice, limiting sugary beverages    Follow up if symptoms worsen or fail to improve.

## 2022-02-28 NOTE — TELEPHONE ENCOUNTER
----- Message from Jhoan Resendiz sent at 2/28/2022 10:57 AM CST -----  Regarding: Patient is needing to talk to a nurse about an issue.  Patient is needing to talk to a nurse about an issue.  Please call  [phone]

## 2022-03-30 ENCOUNTER — PATIENT MESSAGE (OUTPATIENT)
Dept: NEUROLOGY | Facility: CLINIC | Age: 32
End: 2022-03-30
Payer: COMMERCIAL

## 2022-05-23 ENCOUNTER — OFFICE VISIT (OUTPATIENT)
Dept: OBSTETRICS AND GYNECOLOGY | Facility: CLINIC | Age: 32
End: 2022-05-23
Payer: COMMERCIAL

## 2022-05-23 VITALS
TEMPERATURE: 98 F | DIASTOLIC BLOOD PRESSURE: 80 MMHG | RESPIRATION RATE: 17 BRPM | HEART RATE: 112 BPM | BODY MASS INDEX: 33.46 KG/M2 | HEIGHT: 64 IN | WEIGHT: 196 LBS | OXYGEN SATURATION: 98 % | SYSTOLIC BLOOD PRESSURE: 126 MMHG

## 2022-05-23 DIAGNOSIS — Z30.42 SURVEILLANCE OF CONTRACEPTIVE INJECTION: Primary | ICD-10-CM

## 2022-05-23 PROCEDURE — 3008F BODY MASS INDEX DOCD: CPT | Mod: CPTII,,, | Performed by: ADVANCED PRACTICE MIDWIFE

## 2022-05-23 PROCEDURE — 3074F SYST BP LT 130 MM HG: CPT | Mod: CPTII,,, | Performed by: ADVANCED PRACTICE MIDWIFE

## 2022-05-23 PROCEDURE — 99213 PR OFFICE/OUTPT VISIT, EST, LEVL III, 20-29 MIN: ICD-10-PCS | Mod: 25,,, | Performed by: ADVANCED PRACTICE MIDWIFE

## 2022-05-23 PROCEDURE — 96372 PR INJECTION,THERAP/PROPH/DIAG2ST, IM OR SUBCUT: ICD-10-PCS | Mod: ,,, | Performed by: ADVANCED PRACTICE MIDWIFE

## 2022-05-23 PROCEDURE — 99213 OFFICE O/P EST LOW 20 MIN: CPT | Mod: 25,,, | Performed by: ADVANCED PRACTICE MIDWIFE

## 2022-05-23 PROCEDURE — 3079F DIAST BP 80-89 MM HG: CPT | Mod: CPTII,,, | Performed by: ADVANCED PRACTICE MIDWIFE

## 2022-05-23 PROCEDURE — 3079F PR MOST RECENT DIASTOLIC BLOOD PRESSURE 80-89 MM HG: ICD-10-PCS | Mod: CPTII,,, | Performed by: ADVANCED PRACTICE MIDWIFE

## 2022-05-23 PROCEDURE — 3074F PR MOST RECENT SYSTOLIC BLOOD PRESSURE < 130 MM HG: ICD-10-PCS | Mod: CPTII,,, | Performed by: ADVANCED PRACTICE MIDWIFE

## 2022-05-23 PROCEDURE — 1159F PR MEDICATION LIST DOCUMENTED IN MEDICAL RECORD: ICD-10-PCS | Mod: CPTII,,, | Performed by: ADVANCED PRACTICE MIDWIFE

## 2022-05-23 PROCEDURE — 3008F PR BODY MASS INDEX (BMI) DOCUMENTED: ICD-10-PCS | Mod: CPTII,,, | Performed by: ADVANCED PRACTICE MIDWIFE

## 2022-05-23 PROCEDURE — 96372 THER/PROPH/DIAG INJ SC/IM: CPT | Mod: ,,, | Performed by: ADVANCED PRACTICE MIDWIFE

## 2022-05-23 PROCEDURE — 1159F MED LIST DOCD IN RCRD: CPT | Mod: CPTII,,, | Performed by: ADVANCED PRACTICE MIDWIFE

## 2022-05-23 RX ORDER — MEDROXYPROGESTERONE ACETATE 150 MG/ML
150 INJECTION, SUSPENSION INTRAMUSCULAR
Status: COMPLETED | OUTPATIENT
Start: 2022-05-23 | End: 2022-05-23

## 2022-05-23 RX ADMIN — MEDROXYPROGESTERONE ACETATE 150 MG: 150 INJECTION, SUSPENSION INTRAMUSCULAR at 01:05

## 2022-05-23 NOTE — PROGRESS NOTES
"  Patient ID:  Taisha Hunter is a 32 y.o. female      Chief Complaint:   Chief Complaint   Patient presents with    depo     injection        HPI:  Taisha is in for repeat depo injection within dates. She denies ACHES, vag discharge, and abnormal vag bleeding. Has had episodes or spotting. Discussed light spotting as common s/e of depo. Reviewed weight and discussed weight management. She has gained 10 lbs since February. Will review at next visit.  LMP: Patient's last menstrual period was 2022.  Sexually active: yes, , declines STD testing      Past Medical History:   Diagnosis Date    COVID-19     Headache      History reviewed. No pertinent surgical history.    OB History        3    Para   3    Term                AB        Living           SAB        IAB        Ectopic        Multiple        Live Births                     /80 (BP Location: Right arm, Patient Position: Sitting, BP Method: Medium (Automatic))   Pulse (!) 112   Temp 98 °F (36.7 °C)   Resp 17   Ht 5' 4" (1.626 m)   Wt 88.9 kg (196 lb)   LMP 2022   SpO2 98%   BMI 33.64 kg/m²   Wt Readings from Last 3 Encounters:   22 88.9 kg (196 lb)   22 85.8 kg (189 lb 3.2 oz)   22 84.6 kg (186 lb 6.4 oz)          ROS:  Review of Systems   Constitutional: Negative.    Eyes: Negative.    Respiratory: Negative.    Cardiovascular: Negative.    Gastrointestinal: Negative.    Genitourinary: Negative.    Musculoskeletal: Negative.    Neurological: Negative.    Psychiatric/Behavioral: Negative.           PHYSICAL EXAM:  Physical Exam  Constitutional:       Appearance: Normal appearance. She is obese.   Eyes:      Extraocular Movements: Extraocular movements intact.   Cardiovascular:      Rate and Rhythm: Normal rate.      Pulses: Normal pulses.   Pulmonary:      Effort: Pulmonary effort is normal.   Abdominal:      Palpations: Abdomen is soft.   Musculoskeletal:         General: Normal range of " "motion.      Cervical back: Normal range of motion.   Skin:     General: Skin is warm and dry.   Neurological:      Mental Status: She is alert and oriented to person, place, and time.   Psychiatric:         Mood and Affect: Mood normal.         Behavior: Behavior normal.         Thought Content: Thought content normal.         Judgment: Judgment normal.          Assessment:  Surveillance of contraceptive injection  -     medroxyPROGESTERone (DEPO-PROVERA) injection 150 mg    BMI 33.0-33.9,adult          ICD-10-CM ICD-9-CM    1. Surveillance of contraceptive injection  Z30.42 V25.49 medroxyPROGESTERone (DEPO-PROVERA) injection 150 mg   2. BMI 33.0-33.9,adult  Z68.33 V85.33        Plan:  Depo Provera 150 mg IM given, Reviewed "ACHES" of contraceptives and possible S/E  Encouraged Vitamin D and calcium supplements  Discussed weight management including healthier dietary choices, increased exercise, increase water intake, avoid sugary beverages and high carb foods    Follow up in about 3 months (around 8/23/2022) for repeat depo injection.                    "

## 2022-06-13 ENCOUNTER — OFFICE VISIT (OUTPATIENT)
Dept: PRIMARY CARE CLINIC | Facility: CLINIC | Age: 32
End: 2022-06-13
Payer: COMMERCIAL

## 2022-06-13 VITALS
BODY MASS INDEX: 33.12 KG/M2 | OXYGEN SATURATION: 99 % | WEIGHT: 194 LBS | DIASTOLIC BLOOD PRESSURE: 80 MMHG | TEMPERATURE: 100 F | RESPIRATION RATE: 18 BRPM | HEIGHT: 64 IN | HEART RATE: 104 BPM | SYSTOLIC BLOOD PRESSURE: 126 MMHG

## 2022-06-13 DIAGNOSIS — R07.0 PAIN IN THROAT: Primary | ICD-10-CM

## 2022-06-13 DIAGNOSIS — R52 BODY ACHES: ICD-10-CM

## 2022-06-13 DIAGNOSIS — J06.9 ACUTE UPPER RESPIRATORY INFECTION: Primary | ICD-10-CM

## 2022-06-13 DIAGNOSIS — R05.9 COUGH: ICD-10-CM

## 2022-06-13 PROCEDURE — 1159F MED LIST DOCD IN RCRD: CPT | Mod: ,,, | Performed by: NURSE PRACTITIONER

## 2022-06-13 PROCEDURE — 1159F PR MEDICATION LIST DOCUMENTED IN MEDICAL RECORD: ICD-10-PCS | Mod: ,,, | Performed by: NURSE PRACTITIONER

## 2022-06-13 PROCEDURE — 3074F SYST BP LT 130 MM HG: CPT | Mod: ,,, | Performed by: NURSE PRACTITIONER

## 2022-06-13 PROCEDURE — 3008F BODY MASS INDEX DOCD: CPT | Mod: ,,, | Performed by: NURSE PRACTITIONER

## 2022-06-13 PROCEDURE — 3008F PR BODY MASS INDEX (BMI) DOCUMENTED: ICD-10-PCS | Mod: ,,, | Performed by: NURSE PRACTITIONER

## 2022-06-13 PROCEDURE — 99213 OFFICE O/P EST LOW 20 MIN: CPT | Mod: ,,, | Performed by: NURSE PRACTITIONER

## 2022-06-13 PROCEDURE — 99213 PR OFFICE/OUTPT VISIT, EST, LEVL III, 20-29 MIN: ICD-10-PCS | Mod: ,,, | Performed by: NURSE PRACTITIONER

## 2022-06-13 PROCEDURE — 1160F RVW MEDS BY RX/DR IN RCRD: CPT | Mod: ,,, | Performed by: NURSE PRACTITIONER

## 2022-06-13 PROCEDURE — 1160F PR REVIEW ALL MEDS BY PRESCRIBER/CLIN PHARMACIST DOCUMENTED: ICD-10-PCS | Mod: ,,, | Performed by: NURSE PRACTITIONER

## 2022-06-13 PROCEDURE — 3074F PR MOST RECENT SYSTOLIC BLOOD PRESSURE < 130 MM HG: ICD-10-PCS | Mod: ,,, | Performed by: NURSE PRACTITIONER

## 2022-06-13 PROCEDURE — 3079F DIAST BP 80-89 MM HG: CPT | Mod: ,,, | Performed by: NURSE PRACTITIONER

## 2022-06-13 PROCEDURE — 3079F PR MOST RECENT DIASTOLIC BLOOD PRESSURE 80-89 MM HG: ICD-10-PCS | Mod: ,,, | Performed by: NURSE PRACTITIONER

## 2022-06-13 RX ORDER — METHYLPREDNISOLONE 4 MG/1
TABLET ORAL
Qty: 21 EACH | Refills: 0 | Status: SHIPPED | OUTPATIENT
Start: 2022-06-13 | End: 2022-07-04

## 2022-06-13 RX ORDER — PROMETHAZINE HYDROCHLORIDE AND DEXTROMETHORPHAN HYDROBROMIDE 6.25; 15 MG/5ML; MG/5ML
5 SYRUP ORAL EVERY 4 HOURS PRN
Qty: 240 ML | Refills: 0 | Status: SHIPPED | OUTPATIENT
Start: 2022-06-13 | End: 2022-10-18

## 2022-06-13 RX ORDER — AMOXICILLIN AND CLAVULANATE POTASSIUM 875; 125 MG/1; MG/1
1 TABLET, FILM COATED ORAL EVERY 12 HOURS
Qty: 20 TABLET | Refills: 0 | Status: SHIPPED | OUTPATIENT
Start: 2022-06-13 | End: 2022-06-14

## 2022-06-13 NOTE — PROGRESS NOTES
Subjective:       Patient ID: Taisha Hunter is a 32 y.o. female.    Chief Complaint: Sore Throat, Headache, Generalized Body Aches, and Cough (Stat=rted feeling bad Saturday afternoon.did have covid exposure)    PT presents with upper resp symptoms x 3 days with covid exposure.     Review of Systems   Constitutional: Negative for activity change, appetite change, chills, fatigue and fever.   HENT: Positive for nasal congestion, rhinorrhea and sinus pressure/congestion. Negative for ear discharge, nosebleeds, postnasal drip, sneezing, sore throat and tinnitus.    Eyes: Negative for pain, discharge, redness and itching.   Respiratory: Positive for cough. Negative for choking, chest tightness, shortness of breath and wheezing.    Cardiovascular: Negative for chest pain.   Gastrointestinal: Negative for abdominal distention, abdominal pain, blood in stool, change in bowel habit, constipation, diarrhea, nausea, vomiting and change in bowel habit.   Genitourinary: Negative for decreased urine volume, dysuria, flank pain, frequency, menstrual irregularity and menstrual problem.   Musculoskeletal: Positive for arthralgias. Negative for back pain and gait problem.   Integumentary:  Negative for wound, breast mass and breast discharge.   Allergic/Immunologic: Negative for immunocompromised state.   Neurological: Negative for dizziness, light-headedness and headaches.   Psychiatric/Behavioral: Negative for agitation, behavioral problems and hallucinations.   Breast: Negative for mass        Objective:      Physical Exam  Vitals and nursing note reviewed.   Constitutional:       Appearance: Normal appearance.   HENT:      Head: Normocephalic.      Right Ear: Tympanic membrane is erythematous.      Left Ear: Tympanic membrane is erythematous.      Nose: Congestion present.      Mouth/Throat:      Mouth: Mucous membranes are moist.   Eyes:      Conjunctiva/sclera: Conjunctivae normal.   Cardiovascular:      Rate and Rhythm:  Normal rate and regular rhythm.      Heart sounds: Normal heart sounds.   Pulmonary:      Effort: Pulmonary effort is normal.      Breath sounds: Normal breath sounds.   Musculoskeletal:         General: Normal range of motion.   Neurological:      Mental Status: She is alert and oriented to person, place, and time.   Psychiatric:         Mood and Affect: Mood normal.         Behavior: Behavior normal.         Assessment:       Problem List Items Addressed This Visit    None     Visit Diagnoses     Pain in throat    -  Primary    Relevant Orders    POCT rapid strep A    Cough        Relevant Orders    SARS-CoV2 (COVID) with FLU Antigen    Body aches        Relevant Orders    SARS-CoV2 (COVID) with FLU Antigen          Plan:       Quarantine until results are available. I will call to discuss medications if Covid is negative.

## 2022-06-13 NOTE — LETTER
June 13, 2022      [facility]  [facility] [address] [facility] [place] [address]  Phone: [phone]  Fax: [phone]       Patient: Taisha Hunter   YOB: 1990  Date of Visit: 06/13/2022    To Whom It May Concern:    Demar Hunter  was at [facility] on 06/13/2022. The patient may return to work/school on 06/14/2022 with no restrictions. If you have any questions or concerns, or if I can be of further assistance, please do not hesitate to contact me.    Sincerely,    AVA Hernandez

## 2022-06-14 ENCOUNTER — OFFICE VISIT (OUTPATIENT)
Dept: FAMILY MEDICINE | Facility: CLINIC | Age: 32
End: 2022-06-14
Payer: COMMERCIAL

## 2022-06-14 VITALS
RESPIRATION RATE: 18 BRPM | TEMPERATURE: 101 F | HEIGHT: 64 IN | SYSTOLIC BLOOD PRESSURE: 122 MMHG | OXYGEN SATURATION: 99 % | HEART RATE: 92 BPM | BODY MASS INDEX: 33.12 KG/M2 | WEIGHT: 194 LBS | DIASTOLIC BLOOD PRESSURE: 78 MMHG

## 2022-06-14 DIAGNOSIS — U07.1 COVID-19 VIRUS INFECTION: Primary | ICD-10-CM

## 2022-06-14 DIAGNOSIS — J02.9 SORE THROAT: ICD-10-CM

## 2022-06-14 DIAGNOSIS — R50.9 FEVER, UNSPECIFIED FEVER CAUSE: ICD-10-CM

## 2022-06-14 LAB
CTP QC/QA: YES
CTP QC/QA: YES
FLUAV AG NPH QL: NEGATIVE
FLUBV AG NPH QL: NEGATIVE
S PYO RRNA THROAT QL PROBE: NEGATIVE
SARS-COV-2 AG RESP QL IA.RAPID: POSITIVE

## 2022-06-14 PROCEDURE — 99214 PR OFFICE/OUTPT VISIT, EST, LEVL IV, 30-39 MIN: ICD-10-PCS | Mod: ,,, | Performed by: NURSE PRACTITIONER

## 2022-06-14 PROCEDURE — 3078F PR MOST RECENT DIASTOLIC BLOOD PRESSURE < 80 MM HG: ICD-10-PCS | Mod: CPTII,,, | Performed by: NURSE PRACTITIONER

## 2022-06-14 PROCEDURE — 3008F PR BODY MASS INDEX (BMI) DOCUMENTED: ICD-10-PCS | Mod: CPTII,,, | Performed by: NURSE PRACTITIONER

## 2022-06-14 PROCEDURE — 1159F PR MEDICATION LIST DOCUMENTED IN MEDICAL RECORD: ICD-10-PCS | Mod: CPTII,,, | Performed by: NURSE PRACTITIONER

## 2022-06-14 PROCEDURE — 99214 OFFICE O/P EST MOD 30 MIN: CPT | Mod: ,,, | Performed by: NURSE PRACTITIONER

## 2022-06-14 PROCEDURE — 87428 POCT SARS-COV2 (COVID) WITH FLU ANTIGEN: ICD-10-PCS | Mod: QW,,, | Performed by: NURSE PRACTITIONER

## 2022-06-14 PROCEDURE — 3074F PR MOST RECENT SYSTOLIC BLOOD PRESSURE < 130 MM HG: ICD-10-PCS | Mod: CPTII,,, | Performed by: NURSE PRACTITIONER

## 2022-06-14 PROCEDURE — 87428 SARSCOV & INF VIR A&B AG IA: CPT | Mod: QW,,, | Performed by: NURSE PRACTITIONER

## 2022-06-14 PROCEDURE — 87880 POCT RAPID STREP A: ICD-10-PCS | Mod: QW,,, | Performed by: NURSE PRACTITIONER

## 2022-06-14 PROCEDURE — 3008F BODY MASS INDEX DOCD: CPT | Mod: CPTII,,, | Performed by: NURSE PRACTITIONER

## 2022-06-14 PROCEDURE — 1160F RVW MEDS BY RX/DR IN RCRD: CPT | Mod: CPTII,,, | Performed by: NURSE PRACTITIONER

## 2022-06-14 PROCEDURE — 1160F PR REVIEW ALL MEDS BY PRESCRIBER/CLIN PHARMACIST DOCUMENTED: ICD-10-PCS | Mod: CPTII,,, | Performed by: NURSE PRACTITIONER

## 2022-06-14 PROCEDURE — 87880 STREP A ASSAY W/OPTIC: CPT | Mod: QW,,, | Performed by: NURSE PRACTITIONER

## 2022-06-14 PROCEDURE — 1159F MED LIST DOCD IN RCRD: CPT | Mod: CPTII,,, | Performed by: NURSE PRACTITIONER

## 2022-06-14 PROCEDURE — 3074F SYST BP LT 130 MM HG: CPT | Mod: CPTII,,, | Performed by: NURSE PRACTITIONER

## 2022-06-14 PROCEDURE — 3078F DIAST BP <80 MM HG: CPT | Mod: CPTII,,, | Performed by: NURSE PRACTITIONER

## 2022-06-14 RX ORDER — IBUPROFEN 200 MG
400 TABLET ORAL
Status: COMPLETED | OUTPATIENT
Start: 2022-06-14 | End: 2022-06-14

## 2022-06-14 RX ADMIN — Medication 400 MG: at 09:06

## 2022-06-14 NOTE — PATIENT INSTRUCTIONS
Isolate at home for 5 days, except to seek medical attention. If you have no symptoms or your symptoms are resolving, you can leave your house   Continue to wear a well-fitting mask when around others for additional five days after isolation.  If you have fever (temperature 100.4 or greater), stay home until your fever resolves.  Stay in a specific room away from other people in your home. If possible, use a separate bathroom. If you must be around others, wear a mask.  Monitor your symptoms carefully. Go to the emergency room if you start showing any of these signs: trouble breathing, persistent pain or pressure in chest, confusion, difficulty staying awake or inability to wake, pale/gray/blue-colored skin, lips or nail beds.  Get rest and stay hydrated.  If you have a doctor's appointment, informed you healthcare provider ahead of time that you have COVID 19.  OTC Medications  Ibuprofen or acetaminophen as instructed for fever (temperature 100.4 and greater), and body aches/headaches.  Vitamin C 500 mg per day.  Zinc  mg per day.  Melatonin 0.3-2 mg at bedtime as needed for sleep.  Vitamin D3 1,000-4,000 IU per day.  Aspirin 81 mg per day.

## 2022-06-14 NOTE — PROGRESS NOTES
[facility]    Chief Complaint      Chief Complaint   Patient presents with    Generalized Body Aches    Headache    Nasal Congestion    Fever    Cough    Otalgia     States symptoms present about 3 days with OTC and Rxs regimens in use, feels that her testing on yesterday was done to early    Sore Throat     History of Present Illness      Taisha Hunter is a 32 y.o. female with chronic conditions of hypothyroidism who presents today for c/o URI symptoms x3 days. She was seen by ALLISON Hahn NP yesterday and prescribed Augmentin. Patients reports that she had an allergic reaction and has not been taking the antibiotic.     URI   This is a new problem. The current episode started in the past 7 days. The problem has been gradually worsening. The maximum temperature recorded prior to her arrival was 100.4 - 100.9 F. The fever has been present for 3 to 4 days. Associated symptoms include congestion, coughing, ear pain, headaches, rhinorrhea and a sore throat. Pertinent negatives include no abdominal pain, chest pain, diarrhea, dysuria, nausea, rash, sinus pain, vomiting or wheezing. She has tried decongestant for the symptoms. The treatment provided mild relief.     Past Medical History:  Past Medical History:   Diagnosis Date    COVID-19     Headache      Past Surgical History:   has no past surgical history on file.    Social History:  Social History     Tobacco Use    Smoking status: Never Smoker    Smokeless tobacco: Never Used   Substance Use Topics    Alcohol use: Never    Drug use: Never     I personally reviewed all past medical, surgical, and social.     Review of Systems   Constitutional: Positive for fever and malaise/fatigue. Negative for chills.   HENT: Positive for congestion, ear pain, rhinorrhea and sore throat. Negative for hearing loss, sinus pain and tinnitus.    Eyes: Negative for blurred vision and double vision.   Respiratory: Positive for cough. Negative for sputum  production, shortness of breath and wheezing.    Cardiovascular: Negative for chest pain, palpitations and leg swelling.   Gastrointestinal: Negative for abdominal pain, diarrhea, nausea and vomiting.   Genitourinary: Negative for dysuria, frequency and urgency.   Musculoskeletal: Positive for myalgias.   Skin: Negative for itching and rash.   Neurological: Positive for headaches. Negative for dizziness and weakness.   Endo/Heme/Allergies: Negative for environmental allergies. Does not bruise/bleed easily.   Psychiatric/Behavioral: Negative for suicidal ideas.      Medications:  Outpatient Encounter Medications as of 6/14/2022   Medication Sig Dispense Refill    ergocalciferol (ERGOCALCIFEROL) 50,000 unit Cap Take one capsule weekly for 12 weeks then take 1 capsule monthly after that 4 capsule 5    methylPREDNISolone (MEDROL DOSEPACK) 4 mg tablet use as directed 21 each 0    promethazine (PHENERGAN) 25 MG tablet Take 1 tablet every 8 hours as needed for migraine.  Not more than 3 days of the week 10 tablet 3    promethazine-dextromethorphan (PROMETHAZINE-DM) 6.25-15 mg/5 mL Syrp Take 5 mLs by mouth every 4 (four) hours as needed (cough). 240 mL 0    rizatriptan (MAXALT) 10 MG tablet Take 1 tablet (10 mg total) by mouth as needed for Migraine. 10 tablet 1    [DISCONTINUED] amoxicillin-clavulanate 875-125mg (AUGMENTIN) 875-125 mg per tablet Take 1 tablet by mouth every 12 (twelve) hours. for 10 days (Patient not taking: Reported on 6/14/2022) 20 tablet 0     Facility-Administered Encounter Medications as of 6/14/2022   Medication Dose Route Frequency Provider Last Rate Last Admin    [COMPLETED] ibuprofen tablet 400 mg  400 mg Oral 1 time in Clinic/HOD FARSHAD RivasP   400 mg at 06/14/22 0941     Allergies:  Review of patient's allergies indicates:   Allergen Reactions    Augmentin [amoxicillin-pot clavulanate] Swelling     Health Maintenance:    There is no immunization history on file for this patient.  "  Health Maintenance   Topic Date Due    Hepatitis C Screening  Never done    TETANUS VACCINE  Never done    Lipid Panel  Completed      Physical Exam      Vital Signs  Temp: (!) 100.8 °F (38.2 °C)  Pulse: 92  Resp: 18  SpO2: 99 %  BP: 122/78  Pain Score: 10-Worst pain ever  Pain Loc: Throat  Height and Weight  Height: 5' 4" (162.6 cm)  Weight: 88 kg (194 lb)  BSA (Calculated - sq m): 1.99 sq meters  BMI (Calculated): 33.3  Weight in (lb) to have BMI = 25: 145.3]    Physical Exam  Vitals and nursing note reviewed.   Constitutional:       General: She is not in acute distress.     Appearance: Normal appearance. She is ill-appearing.   HENT:      Head: Normocephalic and atraumatic.      Right Ear: External ear normal.      Left Ear: External ear normal.      Nose: Congestion present.      Mouth/Throat:      Mouth: Mucous membranes are moist.      Pharynx: Oropharynx is clear.   Eyes:      Conjunctiva/sclera: Conjunctivae normal.      Pupils: Pupils are equal, round, and reactive to light.   Cardiovascular:      Rate and Rhythm: Normal rate and regular rhythm.      Pulses: Normal pulses.      Heart sounds: Normal heart sounds. No murmur heard.  Pulmonary:      Effort: Pulmonary effort is normal. No respiratory distress.      Breath sounds: Normal breath sounds.   Musculoskeletal:         General: Normal range of motion.      Cervical back: Normal range of motion and neck supple.   Skin:     General: Skin is warm and dry.   Neurological:      General: No focal deficit present.      Mental Status: She is alert and oriented to person, place, and time. Mental status is at baseline.   Psychiatric:         Mood and Affect: Mood normal.         Behavior: Behavior normal.         Thought Content: Thought content normal.         Judgment: Judgment normal.        Laboratory:  CBC:  Recent Labs   Lab 01/31/22  1532   WBC 7.67   RBC 4.32   Hemoglobin 11.3 L   Hematocrit 34.7 L   Platelet Count 337   MCV 80.3   MCH 26.2 L   MCHC " 32.6     CMP:  Recent Labs   Lab 01/31/22  1532   Glucose 81   Calcium 9.7   Sodium 139   Potassium 3.9   CO2 29   Chloride 104   BUN 10     LIPIDS:  Recent Labs   Lab 11/09/21  0921 01/31/22  1532   TSH  --  1.200   HDL Cholesterol 62 H  --    Cholesterol 197  --    Triglycerides 73  --    LDL Calculated 120  --    Cholesterol/HDL Ratio (Risk Factor) 3.2  --    Non-  --      TSH:  Recent Labs   Lab 01/31/22  1532   TSH 1.200     Assessment/Plan     Taisha Hunter is a 32 y.o.female with:    1. Fever, unspecified fever cause  - POCT SARS-COV2 (COVID) with Flu Antigen  - ibuprofen tablet 400 mg    2. Sore throat  - POCT rapid strep A    3. COVID-19 virus infection   OTC Medications  o Ibuprofen or acetaminophen as instructed for fever (temperature 100.4 and greater), and body aches/headaches.  o Vitamin C 500 mg per day.  o Zinc  mg per day.  o Melatonin 0.3-2 mg at bedtime as needed for sleep.  o Vitamin D3 1,000-4,000 IU per day.    Chronic conditions status updated as per HPI.  Other than changes above, cont current medications and maintain follow up with specialists.  Return to clinic as needed.    Carey Soares, FNP  [facility]

## 2022-06-14 NOTE — LETTER
[address] [facility] [address]  Phone: [phone]  Fax: [phone]          Return to Work/School    Patient: Taisha Hunter  YOB: 1990   Date: 06/14/2022     To Whom It May Concern:     Taisha Hutner was in contact with/seen in my office on 06/14/2022. COVID-19 is present in our communities across the [facility]. There is limited testing for COVID at this time, so not all patients can be tested. In this situation, your employee meets the following criteria:     Taisha Hunter has met the criteria for COVID-19 testing and has a POSITIVE result. She can return to work 06/17/2022.      If you have any questions or concerns, or if I can be of further assistance, please do not hesitate to contact me.     Sincerely,    AVA Rivas

## 2022-08-15 ENCOUNTER — OFFICE VISIT (OUTPATIENT)
Dept: OBSTETRICS AND GYNECOLOGY | Facility: CLINIC | Age: 32
End: 2022-08-15
Payer: COMMERCIAL

## 2022-08-15 VITALS
SYSTOLIC BLOOD PRESSURE: 133 MMHG | OXYGEN SATURATION: 98 % | HEART RATE: 91 BPM | HEIGHT: 64 IN | DIASTOLIC BLOOD PRESSURE: 84 MMHG | WEIGHT: 199.19 LBS | BODY MASS INDEX: 34.01 KG/M2

## 2022-08-15 DIAGNOSIS — Z30.42 SURVEILLANCE OF CONTRACEPTIVE INJECTION: Primary | ICD-10-CM

## 2022-08-15 PROCEDURE — 99212 PR OFFICE/OUTPT VISIT, EST, LEVL II, 10-19 MIN: ICD-10-PCS | Mod: 25,,, | Performed by: ADVANCED PRACTICE MIDWIFE

## 2022-08-15 PROCEDURE — 3008F BODY MASS INDEX DOCD: CPT | Mod: CPTII,,, | Performed by: ADVANCED PRACTICE MIDWIFE

## 2022-08-15 PROCEDURE — 1159F MED LIST DOCD IN RCRD: CPT | Mod: CPTII,,, | Performed by: ADVANCED PRACTICE MIDWIFE

## 2022-08-15 PROCEDURE — 1159F PR MEDICATION LIST DOCUMENTED IN MEDICAL RECORD: ICD-10-PCS | Mod: CPTII,,, | Performed by: ADVANCED PRACTICE MIDWIFE

## 2022-08-15 PROCEDURE — 3075F SYST BP GE 130 - 139MM HG: CPT | Mod: CPTII,,, | Performed by: ADVANCED PRACTICE MIDWIFE

## 2022-08-15 PROCEDURE — 3075F PR MOST RECENT SYSTOLIC BLOOD PRESS GE 130-139MM HG: ICD-10-PCS | Mod: CPTII,,, | Performed by: ADVANCED PRACTICE MIDWIFE

## 2022-08-15 PROCEDURE — 99212 OFFICE O/P EST SF 10 MIN: CPT | Mod: 25,,, | Performed by: ADVANCED PRACTICE MIDWIFE

## 2022-08-15 PROCEDURE — 3008F PR BODY MASS INDEX (BMI) DOCUMENTED: ICD-10-PCS | Mod: CPTII,,, | Performed by: ADVANCED PRACTICE MIDWIFE

## 2022-08-15 PROCEDURE — 3079F DIAST BP 80-89 MM HG: CPT | Mod: CPTII,,, | Performed by: ADVANCED PRACTICE MIDWIFE

## 2022-08-15 PROCEDURE — 96372 PR INJECTION,THERAP/PROPH/DIAG2ST, IM OR SUBCUT: ICD-10-PCS | Mod: ,,, | Performed by: ADVANCED PRACTICE MIDWIFE

## 2022-08-15 PROCEDURE — 3079F PR MOST RECENT DIASTOLIC BLOOD PRESSURE 80-89 MM HG: ICD-10-PCS | Mod: CPTII,,, | Performed by: ADVANCED PRACTICE MIDWIFE

## 2022-08-15 PROCEDURE — 96372 THER/PROPH/DIAG INJ SC/IM: CPT | Mod: ,,, | Performed by: ADVANCED PRACTICE MIDWIFE

## 2022-08-15 RX ORDER — MEDROXYPROGESTERONE ACETATE 150 MG/ML
150 INJECTION, SUSPENSION INTRAMUSCULAR
Status: COMPLETED | OUTPATIENT
Start: 2022-08-15 | End: 2022-08-15

## 2022-08-15 RX ADMIN — MEDROXYPROGESTERONE ACETATE 150 MG: 150 INJECTION, SUSPENSION INTRAMUSCULAR at 08:08

## 2022-08-15 NOTE — PROGRESS NOTES
"  Patient ID:  Taisha Hunter is a 32 y.o. female      Chief Complaint:   Chief Complaint   Patient presents with    Contraception     Depo        HPI:  Taisha is in for repeat depo injection within dates. Denies ACHES, vag discharge, and abnormal vag bleeding. Declines STD testing. Desires to continue depo.   LMP: No LMP recorded (lmp unknown). Patient has had an injection.  Sexually active: yes    Past Medical History:   Diagnosis Date    COVID-19     Headache        History reviewed. No pertinent surgical history.    OB History        3    Para   3    Term                AB        Living           SAB        IAB        Ectopic        Multiple        Live Births                     /84 (BP Location: Right arm, Patient Position: Sitting, BP Method: Large (Automatic))   Pulse 91   Ht 5' 4" (1.626 m)   Wt 90.4 kg (199 lb 3.2 oz)   LMP  (LMP Unknown)   SpO2 98%   BMI 34.19 kg/m²   Wt Readings from Last 3 Encounters:   08/15/22 90.4 kg (199 lb 3.2 oz)   22 88 kg (194 lb)   22 88 kg (194 lb)          ROS:  Review of Systems   Constitutional: Negative.    Eyes: Negative.    Respiratory: Negative.    Cardiovascular: Negative.    Gastrointestinal: Negative.    Genitourinary: Negative.    Musculoskeletal: Negative.    Neurological: Negative.    Psychiatric/Behavioral: Negative.           PHYSICAL EXAM:  Physical Exam  Constitutional:       Appearance: Normal appearance. She is obese.   Eyes:      Extraocular Movements: Extraocular movements intact.   Cardiovascular:      Rate and Rhythm: Normal rate.      Pulses: Normal pulses.   Pulmonary:      Effort: Pulmonary effort is normal.   Abdominal:      Palpations: Abdomen is soft.   Musculoskeletal:         General: Normal range of motion.      Cervical back: Normal range of motion.   Skin:     General: Skin is warm and dry.   Neurological:      Mental Status: She is alert and oriented to person, place, and time.   Psychiatric:        " " Mood and Affect: Mood normal.         Behavior: Behavior normal.         Thought Content: Thought content normal.         Judgment: Judgment normal.          Assessment:  Surveillance of contraceptive injection  -     medroxyPROGESTERone (DEPO-PROVERA) injection 150 mg    BMI 34.0-34.9,adult          ICD-10-CM ICD-9-CM    1. Surveillance of contraceptive injection  Z30.42 V25.49 medroxyPROGESTERone (DEPO-PROVERA) injection 150 mg   2. BMI 34.0-34.9,adult  Z68.34 V85.34        Plan:  Depo Provera 150 mg IM given, Reviewed "ACHES" of contraceptives and possible S/E  Encouraged Vitamin D and calcium supplements or daily multivitamin  Instructed on condom use during each sexual encounter    Follow up in about 3 months (around 11/15/2022) for repeat depo injection.                    "

## 2022-10-18 ENCOUNTER — OFFICE VISIT (OUTPATIENT)
Dept: PRIMARY CARE CLINIC | Facility: CLINIC | Age: 32
End: 2022-10-18
Payer: COMMERCIAL

## 2022-10-18 VITALS
HEART RATE: 96 BPM | DIASTOLIC BLOOD PRESSURE: 80 MMHG | BODY MASS INDEX: 35.34 KG/M2 | RESPIRATION RATE: 16 BRPM | WEIGHT: 207 LBS | TEMPERATURE: 99 F | OXYGEN SATURATION: 99 % | HEIGHT: 64 IN | SYSTOLIC BLOOD PRESSURE: 128 MMHG

## 2022-10-18 DIAGNOSIS — J10.1 INFLUENZA A: Primary | ICD-10-CM

## 2022-10-18 DIAGNOSIS — R52 BODY ACHES: ICD-10-CM

## 2022-10-18 LAB
CTP QC/QA: YES
FLUAV AG NPH QL: POSITIVE
FLUBV AG NPH QL: NEGATIVE

## 2022-10-18 PROCEDURE — 99212 OFFICE O/P EST SF 10 MIN: CPT | Mod: ,,, | Performed by: NURSE PRACTITIONER

## 2022-10-18 PROCEDURE — 3079F PR MOST RECENT DIASTOLIC BLOOD PRESSURE 80-89 MM HG: ICD-10-PCS | Mod: CPTII,,, | Performed by: NURSE PRACTITIONER

## 2022-10-18 PROCEDURE — 1160F RVW MEDS BY RX/DR IN RCRD: CPT | Mod: CPTII,,, | Performed by: NURSE PRACTITIONER

## 2022-10-18 PROCEDURE — 99212 PR OFFICE/OUTPT VISIT, EST, LEVL II, 10-19 MIN: ICD-10-PCS | Mod: ,,, | Performed by: NURSE PRACTITIONER

## 2022-10-18 PROCEDURE — 3079F DIAST BP 80-89 MM HG: CPT | Mod: CPTII,,, | Performed by: NURSE PRACTITIONER

## 2022-10-18 PROCEDURE — 1159F MED LIST DOCD IN RCRD: CPT | Mod: CPTII,,, | Performed by: NURSE PRACTITIONER

## 2022-10-18 PROCEDURE — 87804 INFLUENZA ASSAY W/OPTIC: CPT | Mod: QW,,, | Performed by: NURSE PRACTITIONER

## 2022-10-18 PROCEDURE — 1160F PR REVIEW ALL MEDS BY PRESCRIBER/CLIN PHARMACIST DOCUMENTED: ICD-10-PCS | Mod: CPTII,,, | Performed by: NURSE PRACTITIONER

## 2022-10-18 PROCEDURE — 3074F SYST BP LT 130 MM HG: CPT | Mod: CPTII,,, | Performed by: NURSE PRACTITIONER

## 2022-10-18 PROCEDURE — 1159F PR MEDICATION LIST DOCUMENTED IN MEDICAL RECORD: ICD-10-PCS | Mod: CPTII,,, | Performed by: NURSE PRACTITIONER

## 2022-10-18 PROCEDURE — 87804 POCT INFLUENZA A/B: ICD-10-PCS | Mod: 59,QW,, | Performed by: NURSE PRACTITIONER

## 2022-10-18 PROCEDURE — 3074F PR MOST RECENT SYSTOLIC BLOOD PRESSURE < 130 MM HG: ICD-10-PCS | Mod: CPTII,,, | Performed by: NURSE PRACTITIONER

## 2022-10-18 NOTE — LETTER
October 18, 2022      [facility]  [address]  [place] [address]  Phone: [phone]  Fax: [phone]       Patient: Taisha Hunter   YOB: 1990  Date of Visit: 10/18/2022    To Whom It May Concern:    Demar Hunter  was at [facility] on 10/18/2022. The patient may return to work/school on 10/22/2022 with no restrictions. If you have any questions or concerns, or if I can be of further assistance, please do not hesitate to contact me.    Sincerely,    AVA Hernandez

## 2022-11-01 ENCOUNTER — OFFICE VISIT (OUTPATIENT)
Dept: OBSTETRICS AND GYNECOLOGY | Facility: CLINIC | Age: 32
End: 2022-11-01
Payer: COMMERCIAL

## 2022-11-01 VITALS
BODY MASS INDEX: 34.89 KG/M2 | HEART RATE: 78 BPM | SYSTOLIC BLOOD PRESSURE: 120 MMHG | OXYGEN SATURATION: 98 % | DIASTOLIC BLOOD PRESSURE: 80 MMHG | WEIGHT: 204.38 LBS | RESPIRATION RATE: 18 BRPM | HEIGHT: 64 IN

## 2022-11-01 DIAGNOSIS — Z30.42 ENCOUNTER FOR SURVEILLANCE OF INJECTABLE CONTRACEPTIVE: Primary | ICD-10-CM

## 2022-11-01 PROCEDURE — 99212 OFFICE O/P EST SF 10 MIN: CPT | Mod: 25,,, | Performed by: ADVANCED PRACTICE MIDWIFE

## 2022-11-01 PROCEDURE — 3074F PR MOST RECENT SYSTOLIC BLOOD PRESSURE < 130 MM HG: ICD-10-PCS | Mod: CPTII,,, | Performed by: ADVANCED PRACTICE MIDWIFE

## 2022-11-01 PROCEDURE — 96372 THER/PROPH/DIAG INJ SC/IM: CPT | Mod: ,,, | Performed by: ADVANCED PRACTICE MIDWIFE

## 2022-11-01 PROCEDURE — 3079F PR MOST RECENT DIASTOLIC BLOOD PRESSURE 80-89 MM HG: ICD-10-PCS | Mod: CPTII,,, | Performed by: ADVANCED PRACTICE MIDWIFE

## 2022-11-01 PROCEDURE — 1159F PR MEDICATION LIST DOCUMENTED IN MEDICAL RECORD: ICD-10-PCS | Mod: CPTII,,, | Performed by: ADVANCED PRACTICE MIDWIFE

## 2022-11-01 PROCEDURE — 3079F DIAST BP 80-89 MM HG: CPT | Mod: CPTII,,, | Performed by: ADVANCED PRACTICE MIDWIFE

## 2022-11-01 PROCEDURE — 99212 PR OFFICE/OUTPT VISIT, EST, LEVL II, 10-19 MIN: ICD-10-PCS | Mod: 25,,, | Performed by: ADVANCED PRACTICE MIDWIFE

## 2022-11-01 PROCEDURE — 96372 PR INJECTION,THERAP/PROPH/DIAG2ST, IM OR SUBCUT: ICD-10-PCS | Mod: ,,, | Performed by: ADVANCED PRACTICE MIDWIFE

## 2022-11-01 PROCEDURE — 3008F PR BODY MASS INDEX (BMI) DOCUMENTED: ICD-10-PCS | Mod: CPTII,,, | Performed by: ADVANCED PRACTICE MIDWIFE

## 2022-11-01 PROCEDURE — 3008F BODY MASS INDEX DOCD: CPT | Mod: CPTII,,, | Performed by: ADVANCED PRACTICE MIDWIFE

## 2022-11-01 PROCEDURE — 3074F SYST BP LT 130 MM HG: CPT | Mod: CPTII,,, | Performed by: ADVANCED PRACTICE MIDWIFE

## 2022-11-01 PROCEDURE — 1159F MED LIST DOCD IN RCRD: CPT | Mod: CPTII,,, | Performed by: ADVANCED PRACTICE MIDWIFE

## 2022-11-01 RX ORDER — MEDROXYPROGESTERONE ACETATE 150 MG/ML
150 INJECTION, SUSPENSION INTRAMUSCULAR
Status: COMPLETED | OUTPATIENT
Start: 2022-11-01 | End: 2022-11-01

## 2022-11-01 RX ADMIN — MEDROXYPROGESTERONE ACETATE 150 MG: 150 INJECTION, SUSPENSION INTRAMUSCULAR at 02:11

## 2022-11-01 NOTE — PROGRESS NOTES
"    Patient ID:  Taisha Hunter is a 32 y.o. female      Chief Complaint:   Here repeat Depo Provera.    Chief Complaint   Patient presents with    Contraception     Room 3// Depo        HPI:  Depo is due.  She deneis any ACHES.   She has recently had Covid and the flu.  She has fully recovered.  Encouraged to get booster  LMP: Patient's last menstrual period was 2022 (approximate).  Sexually active:yes    Past Medical History:   Diagnosis Date    COVID-19     Headache        No past surgical history on file.    OB History          3    Para   3    Term                AB        Living             SAB        IAB        Ectopic        Multiple        Live Births                     /80   Pulse 78   Resp 18   Ht 5' 4" (1.626 m)   Wt 92.7 kg (204 lb 6.4 oz)   LMP 2022 (Approximate)   SpO2 98%   BMI 35.09 kg/m²   Wt Readings from Last 3 Encounters:   22 92.7 kg (204 lb 6.4 oz)   10/18/22 93.9 kg (207 lb)   08/15/22 90.4 kg (199 lb 3.2 oz)          ROS:  Review of Systems   Constitutional:  Positive for unexpected weight change.   Respiratory: Negative.     Cardiovascular: Negative.    Gastrointestinal: Negative.    Genitourinary: Negative.    Musculoskeletal: Negative.    Neurological: Negative.  Negative for headaches.   Psychiatric/Behavioral: Negative.     Breast: negative.         PHYSICAL EXAM:  Physical Exam  Constitutional:       Appearance: Normal appearance.   HENT:      Head: Normocephalic.   Eyes:      Extraocular Movements: Extraocular movements intact.   Cardiovascular:      Rate and Rhythm: Normal rate.      Pulses: Normal pulses.   Pulmonary:      Effort: Pulmonary effort is normal.   Musculoskeletal:         General: Normal range of motion.      Cervical back: Normal range of motion.   Skin:     General: Skin is warm and dry.   Neurological:      Mental Status: She is alert and oriented to person, place, and time.   Psychiatric:         Mood and Affect: Mood " "normal.         Behavior: Behavior normal.         Thought Content: Thought content normal.         Judgment: Judgment normal.        Assessment:  Encounter for surveillance of injectable contraceptive        ICD-10-CM ICD-9-CM    1. Encounter for surveillance of injectable contraceptive  Z30.42 V25.49           Plan:  Depo Provera 150 mg IM given, Reviewed "ACHES" of contraceptives and possible S/E  Encouraged Vitamin D and calcium supplements or daily multivitamin  Instructed on condom use during each sexual encounter    Follow up in about 3 months (around 2/1/2023) for depo and annual exam.                   "

## 2023-01-24 ENCOUNTER — OFFICE VISIT (OUTPATIENT)
Dept: OBSTETRICS AND GYNECOLOGY | Facility: CLINIC | Age: 33
End: 2023-01-24
Payer: COMMERCIAL

## 2023-01-24 VITALS
HEIGHT: 64 IN | DIASTOLIC BLOOD PRESSURE: 76 MMHG | WEIGHT: 207 LBS | OXYGEN SATURATION: 98 % | SYSTOLIC BLOOD PRESSURE: 122 MMHG | BODY MASS INDEX: 35.34 KG/M2 | HEART RATE: 82 BPM

## 2023-01-24 DIAGNOSIS — Z30.42 ENCOUNTER FOR SURVEILLANCE OF INJECTABLE CONTRACEPTIVE: Primary | ICD-10-CM

## 2023-01-24 PROCEDURE — 99212 PR OFFICE/OUTPT VISIT, EST, LEVL II, 10-19 MIN: ICD-10-PCS | Mod: 25,,, | Performed by: ADVANCED PRACTICE MIDWIFE

## 2023-01-24 PROCEDURE — 3074F SYST BP LT 130 MM HG: CPT | Mod: CPTII,,, | Performed by: ADVANCED PRACTICE MIDWIFE

## 2023-01-24 PROCEDURE — 96372 THER/PROPH/DIAG INJ SC/IM: CPT | Mod: ,,, | Performed by: ADVANCED PRACTICE MIDWIFE

## 2023-01-24 PROCEDURE — 3008F BODY MASS INDEX DOCD: CPT | Mod: CPTII,,, | Performed by: ADVANCED PRACTICE MIDWIFE

## 2023-01-24 PROCEDURE — 1159F PR MEDICATION LIST DOCUMENTED IN MEDICAL RECORD: ICD-10-PCS | Mod: CPTII,,, | Performed by: ADVANCED PRACTICE MIDWIFE

## 2023-01-24 PROCEDURE — 99212 OFFICE O/P EST SF 10 MIN: CPT | Mod: 25,,, | Performed by: ADVANCED PRACTICE MIDWIFE

## 2023-01-24 PROCEDURE — 3078F DIAST BP <80 MM HG: CPT | Mod: CPTII,,, | Performed by: ADVANCED PRACTICE MIDWIFE

## 2023-01-24 PROCEDURE — 1159F MED LIST DOCD IN RCRD: CPT | Mod: CPTII,,, | Performed by: ADVANCED PRACTICE MIDWIFE

## 2023-01-24 PROCEDURE — 96372 PR INJECTION,THERAP/PROPH/DIAG2ST, IM OR SUBCUT: ICD-10-PCS | Mod: ,,, | Performed by: ADVANCED PRACTICE MIDWIFE

## 2023-01-24 PROCEDURE — 3074F PR MOST RECENT SYSTOLIC BLOOD PRESSURE < 130 MM HG: ICD-10-PCS | Mod: CPTII,,, | Performed by: ADVANCED PRACTICE MIDWIFE

## 2023-01-24 PROCEDURE — 3078F PR MOST RECENT DIASTOLIC BLOOD PRESSURE < 80 MM HG: ICD-10-PCS | Mod: CPTII,,, | Performed by: ADVANCED PRACTICE MIDWIFE

## 2023-01-24 PROCEDURE — 3008F PR BODY MASS INDEX (BMI) DOCUMENTED: ICD-10-PCS | Mod: CPTII,,, | Performed by: ADVANCED PRACTICE MIDWIFE

## 2023-01-24 RX ORDER — MEDROXYPROGESTERONE ACETATE 150 MG/ML
150 INJECTION, SUSPENSION INTRAMUSCULAR
Status: COMPLETED | OUTPATIENT
Start: 2023-01-24 | End: 2023-01-24

## 2023-01-24 RX ADMIN — MEDROXYPROGESTERONE ACETATE 150 MG: 150 INJECTION, SUSPENSION INTRAMUSCULAR at 08:01

## 2023-01-24 NOTE — PROGRESS NOTES
"  Patient ID:  Taisha Hunter is a 32 y.o. female      Chief Complaint:   Chief Complaint   Patient presents with    depo     Last pap:          HPI:  Taisha is in for repeat depo injection within dates. Last injection given 22. Denies ACHES, vag discharge and abnormal vag bleeding. Declines STD testing. No issues, problems, or complaints.   LMP: Patient's last menstrual period was 2023.  Sexually active: yes    Past Medical History:   Diagnosis Date    COVID-19     Headache        History reviewed. No pertinent surgical history.    OB History          3    Para   3    Term                AB        Living             SAB        IAB        Ectopic        Multiple        Live Births                     /76 (BP Location: Left arm, Patient Position: Sitting, BP Method: Large (Automatic))   Pulse 82   Ht 5' 4" (1.626 m)   Wt 93.9 kg (207 lb)   LMP 2023   SpO2 98%   BMI 35.53 kg/m²   Wt Readings from Last 3 Encounters:   23 93.9 kg (207 lb)   22 92.7 kg (204 lb 6.4 oz)   10/18/22 93.9 kg (207 lb)          ROS:  Review of Systems   Constitutional: Negative.    Eyes: Negative.    Respiratory: Negative.     Cardiovascular: Negative.    Gastrointestinal: Negative.    Genitourinary: Negative.    Musculoskeletal: Negative.    Neurological: Negative.    Psychiatric/Behavioral: Negative.          PHYSICAL EXAM:  Physical Exam  Constitutional:       Appearance: Normal appearance. She is obese.   Eyes:      Extraocular Movements: Extraocular movements intact.   Cardiovascular:      Rate and Rhythm: Normal rate.      Pulses: Normal pulses.   Pulmonary:      Effort: Pulmonary effort is normal.   Abdominal:      Palpations: Abdomen is soft.   Musculoskeletal:         General: Normal range of motion.      Cervical back: Normal range of motion.   Skin:     General: Skin is warm and dry.   Neurological:      Mental Status: She is alert and oriented to person, place, and time. " "  Psychiatric:         Mood and Affect: Mood normal.         Behavior: Behavior normal.         Thought Content: Thought content normal.         Judgment: Judgment normal.        Assessment:  Encounter for surveillance of injectable contraceptive  -     medroxyPROGESTERone (DEPO-PROVERA) injection 150 mg    BMI 35.0-35.9,adult          ICD-10-CM ICD-9-CM    1. Encounter for surveillance of injectable contraceptive  Z30.42 V25.49 medroxyPROGESTERone (DEPO-PROVERA) injection 150 mg      2. BMI 35.0-35.9,adult  Z68.35 V85.35           Plan:  Depo Provera 150 mg IM given, Reviewed "ACHES" of contraceptives and possible S/E  Encouraged Vitamin D and calcium supplements or daily multivitamin  Instructed on condom use during each sexual encounter to decrease STD exposure risk    Follow up in about 3 months (around 4/24/2023) for repeat depo injection.                    "

## 2023-04-13 ENCOUNTER — OFFICE VISIT (OUTPATIENT)
Dept: OBSTETRICS AND GYNECOLOGY | Facility: CLINIC | Age: 33
End: 2023-04-13
Payer: COMMERCIAL

## 2023-04-13 VITALS
HEART RATE: 77 BPM | TEMPERATURE: 99 F | HEIGHT: 64 IN | BODY MASS INDEX: 35.48 KG/M2 | DIASTOLIC BLOOD PRESSURE: 78 MMHG | SYSTOLIC BLOOD PRESSURE: 130 MMHG | WEIGHT: 207.81 LBS | RESPIRATION RATE: 17 BRPM | OXYGEN SATURATION: 99 %

## 2023-04-13 DIAGNOSIS — Z30.42 ENCOUNTER FOR SURVEILLANCE OF INJECTABLE CONTRACEPTIVE: Primary | ICD-10-CM

## 2023-04-13 PROCEDURE — 1159F MED LIST DOCD IN RCRD: CPT | Mod: CPTII,,, | Performed by: ADVANCED PRACTICE MIDWIFE

## 2023-04-13 PROCEDURE — 96372 PR INJECTION,THERAP/PROPH/DIAG2ST, IM OR SUBCUT: ICD-10-PCS | Mod: ,,, | Performed by: ADVANCED PRACTICE MIDWIFE

## 2023-04-13 PROCEDURE — 1159F PR MEDICATION LIST DOCUMENTED IN MEDICAL RECORD: ICD-10-PCS | Mod: CPTII,,, | Performed by: ADVANCED PRACTICE MIDWIFE

## 2023-04-13 PROCEDURE — 3075F PR MOST RECENT SYSTOLIC BLOOD PRESS GE 130-139MM HG: ICD-10-PCS | Mod: CPTII,,, | Performed by: ADVANCED PRACTICE MIDWIFE

## 2023-04-13 PROCEDURE — 99212 OFFICE O/P EST SF 10 MIN: CPT | Mod: 25,,, | Performed by: ADVANCED PRACTICE MIDWIFE

## 2023-04-13 PROCEDURE — 3078F PR MOST RECENT DIASTOLIC BLOOD PRESSURE < 80 MM HG: ICD-10-PCS | Mod: CPTII,,, | Performed by: ADVANCED PRACTICE MIDWIFE

## 2023-04-13 PROCEDURE — 3008F BODY MASS INDEX DOCD: CPT | Mod: CPTII,,, | Performed by: ADVANCED PRACTICE MIDWIFE

## 2023-04-13 PROCEDURE — 3078F DIAST BP <80 MM HG: CPT | Mod: CPTII,,, | Performed by: ADVANCED PRACTICE MIDWIFE

## 2023-04-13 PROCEDURE — 99212 PR OFFICE/OUTPT VISIT, EST, LEVL II, 10-19 MIN: ICD-10-PCS | Mod: 25,,, | Performed by: ADVANCED PRACTICE MIDWIFE

## 2023-04-13 PROCEDURE — 3075F SYST BP GE 130 - 139MM HG: CPT | Mod: CPTII,,, | Performed by: ADVANCED PRACTICE MIDWIFE

## 2023-04-13 PROCEDURE — 96372 THER/PROPH/DIAG INJ SC/IM: CPT | Mod: ,,, | Performed by: ADVANCED PRACTICE MIDWIFE

## 2023-04-13 PROCEDURE — 3008F PR BODY MASS INDEX (BMI) DOCUMENTED: ICD-10-PCS | Mod: CPTII,,, | Performed by: ADVANCED PRACTICE MIDWIFE

## 2023-04-13 RX ORDER — MEDROXYPROGESTERONE ACETATE 150 MG/ML
150 INJECTION, SUSPENSION INTRAMUSCULAR ONCE
Status: COMPLETED | OUTPATIENT
Start: 2023-04-13 | End: 2023-04-13

## 2023-04-13 RX ADMIN — MEDROXYPROGESTERONE ACETATE 150 MG: 150 INJECTION, SUSPENSION INTRAMUSCULAR at 08:04

## 2023-04-13 NOTE — PROGRESS NOTES
"  Patient ID:  Taisha Hunter is a 33 y.o. female      Chief Complaint:   Chief Complaint   Patient presents with    Contraception     Depo        HPI:  Taisha Hunter is in for repeat depo injection within dates. Denies ACHES, vag discharge, and abnormal vag bleeding. No issues, problems, or complaints. Desires to continue depo injections. Discussed recommendation for yearly gyn exam. Last Pap and annual exam 12/3/21, Pap NILM, HPV negative.   LMP: No LMP recorded. Patient has had an injection.  Sexually active: yes, declined STD testing    Past Medical History:   Diagnosis Date    COVID-19     Headache        History reviewed. No pertinent surgical history.    OB History          3    Para   3    Term                AB        Living             SAB        IAB        Ectopic        Multiple        Live Births                     /78 (BP Location: Right arm)   Pulse 77   Temp 98.9 °F (37.2 °C)   Resp 17   Ht 5' 4" (1.626 m)   Wt 94.3 kg (207 lb 12.8 oz)   SpO2 99%   BMI 35.67 kg/m²   Wt Readings from Last 3 Encounters:   23 94.3 kg (207 lb 12.8 oz)   23 93.9 kg (207 lb)   22 92.7 kg (204 lb 6.4 oz)          ROS:  Review of Systems   Constitutional: Negative.    Eyes: Negative.    Respiratory: Negative.     Cardiovascular: Negative.    Gastrointestinal: Negative.    Genitourinary: Negative.    Musculoskeletal: Negative.    Neurological: Negative.    Psychiatric/Behavioral: Negative.          PHYSICAL EXAM:  Physical Exam  Constitutional:       Appearance: Normal appearance. She is obese.   Eyes:      Extraocular Movements: Extraocular movements intact.   Cardiovascular:      Rate and Rhythm: Normal rate.      Pulses: Normal pulses.   Pulmonary:      Effort: Pulmonary effort is normal.   Abdominal:      Palpations: Abdomen is soft.   Musculoskeletal:         General: Normal range of motion.      Cervical back: Normal range of motion.   Skin:     General: Skin is warm " "and dry.   Neurological:      Mental Status: She is alert and oriented to person, place, and time.   Psychiatric:         Mood and Affect: Mood normal.         Behavior: Behavior normal.         Thought Content: Thought content normal.         Judgment: Judgment normal.        Assessment:  Encounter for surveillance of injectable contraceptive  -     medroxyPROGESTERone (DEPO-PROVERA) syringe 150 mg    BMI 35.0-35.9,adult          ICD-10-CM ICD-9-CM    1. Encounter for surveillance of injectable contraceptive  Z30.42 V25.49 medroxyPROGESTERone (DEPO-PROVERA) syringe 150 mg      2. BMI 35.0-35.9,adult  Z68.35 V85.35           Plan:  Depo Provera 150 mg IM given, Reviewed "ACHES" of contraceptives and possible S/E  Encouraged Vitamin D and calcium supplements or daily multivitamin  Instructed on condom use during each sexual encounter to decrease STD exposure risk    Follow up in about 3 months (around 7/13/2023) for repeat depo injection.                    "

## 2023-06-27 ENCOUNTER — OFFICE VISIT (OUTPATIENT)
Dept: PRIMARY CARE CLINIC | Facility: CLINIC | Age: 33
End: 2023-06-27
Payer: COMMERCIAL

## 2023-06-27 VITALS
SYSTOLIC BLOOD PRESSURE: 124 MMHG | WEIGHT: 214 LBS | TEMPERATURE: 98 F | DIASTOLIC BLOOD PRESSURE: 82 MMHG | OXYGEN SATURATION: 98 % | HEART RATE: 89 BPM | BODY MASS INDEX: 36.54 KG/M2 | HEIGHT: 64 IN

## 2023-06-27 DIAGNOSIS — Z00.01 ENCOUNTER FOR GENERAL ADULT MEDICAL EXAMINATION WITH ABNORMAL FINDINGS: Primary | ICD-10-CM

## 2023-06-27 DIAGNOSIS — M54.42 CHRONIC BILATERAL LOW BACK PAIN WITH BILATERAL SCIATICA: ICD-10-CM

## 2023-06-27 DIAGNOSIS — E03.9 HYPOTHYROIDISM, UNSPECIFIED TYPE: ICD-10-CM

## 2023-06-27 DIAGNOSIS — Z13.1 SCREENING FOR DIABETES MELLITUS: ICD-10-CM

## 2023-06-27 DIAGNOSIS — M54.41 CHRONIC BILATERAL LOW BACK PAIN WITH BILATERAL SCIATICA: ICD-10-CM

## 2023-06-27 DIAGNOSIS — Z13.220 ENCOUNTER FOR SCREENING FOR LIPOID DISORDERS: ICD-10-CM

## 2023-06-27 DIAGNOSIS — R73.09 ABNORMAL GLUCOSE: Primary | ICD-10-CM

## 2023-06-27 DIAGNOSIS — Z11.4 SCREENING FOR HIV (HUMAN IMMUNODEFICIENCY VIRUS): ICD-10-CM

## 2023-06-27 DIAGNOSIS — R22.43 LOCALIZED SWELLING OF BOTH LOWER LEGS: ICD-10-CM

## 2023-06-27 DIAGNOSIS — G89.29 CHRONIC BILATERAL LOW BACK PAIN WITH BILATERAL SCIATICA: ICD-10-CM

## 2023-06-27 DIAGNOSIS — Z11.59 NEED FOR HEPATITIS C SCREENING TEST: ICD-10-CM

## 2023-06-27 PROCEDURE — 3079F PR MOST RECENT DIASTOLIC BLOOD PRESSURE 80-89 MM HG: ICD-10-PCS | Mod: CPTII,,, | Performed by: NURSE PRACTITIONER

## 2023-06-27 PROCEDURE — 1160F PR REVIEW ALL MEDS BY PRESCRIBER/CLIN PHARMACIST DOCUMENTED: ICD-10-PCS | Mod: CPTII,,, | Performed by: NURSE PRACTITIONER

## 2023-06-27 PROCEDURE — 3074F SYST BP LT 130 MM HG: CPT | Mod: CPTII,,, | Performed by: NURSE PRACTITIONER

## 2023-06-27 PROCEDURE — 3079F DIAST BP 80-89 MM HG: CPT | Mod: CPTII,,, | Performed by: NURSE PRACTITIONER

## 2023-06-27 PROCEDURE — 1159F MED LIST DOCD IN RCRD: CPT | Mod: CPTII,,, | Performed by: NURSE PRACTITIONER

## 2023-06-27 PROCEDURE — 3074F PR MOST RECENT SYSTOLIC BLOOD PRESSURE < 130 MM HG: ICD-10-PCS | Mod: CPTII,,, | Performed by: NURSE PRACTITIONER

## 2023-06-27 PROCEDURE — 3008F PR BODY MASS INDEX (BMI) DOCUMENTED: ICD-10-PCS | Mod: CPTII,,, | Performed by: NURSE PRACTITIONER

## 2023-06-27 PROCEDURE — 1159F PR MEDICATION LIST DOCUMENTED IN MEDICAL RECORD: ICD-10-PCS | Mod: CPTII,,, | Performed by: NURSE PRACTITIONER

## 2023-06-27 PROCEDURE — 1160F RVW MEDS BY RX/DR IN RCRD: CPT | Mod: CPTII,,, | Performed by: NURSE PRACTITIONER

## 2023-06-27 PROCEDURE — 3008F BODY MASS INDEX DOCD: CPT | Mod: CPTII,,, | Performed by: NURSE PRACTITIONER

## 2023-06-27 PROCEDURE — 99395 PR PREVENTIVE VISIT,EST,18-39: ICD-10-PCS | Mod: ,,, | Performed by: NURSE PRACTITIONER

## 2023-06-27 PROCEDURE — 99395 PREV VISIT EST AGE 18-39: CPT | Mod: ,,, | Performed by: NURSE PRACTITIONER

## 2023-06-27 RX ORDER — MELOXICAM 15 MG/1
15 TABLET ORAL DAILY
Qty: 90 TABLET | Refills: 1 | Status: SHIPPED | OUTPATIENT
Start: 2023-06-27 | End: 2024-01-19

## 2023-06-27 RX ORDER — HYDROCHLOROTHIAZIDE 12.5 MG/1
12.5 TABLET ORAL DAILY PRN
Qty: 90 TABLET | Refills: 1 | Status: SHIPPED | OUTPATIENT
Start: 2023-06-27

## 2023-06-27 NOTE — PROGRESS NOTES
Subjective     Patient ID: Taisha Hunter is a 33 y.o. female.    Chief Complaint: Annual Exam, Leg Pain, and Foot Pain    Pt presents for a wellness visit and to establish care. Pt reports low back pain that radiates to Bl thighs x several months and mild swelling to BL feet while working.     Review of Systems   Constitutional:  Negative for activity change and unexpected weight change.   HENT:  Negative for hearing loss, rhinorrhea and trouble swallowing.    Eyes:  Negative for discharge and visual disturbance.   Respiratory:  Negative for chest tightness and wheezing.    Cardiovascular:  Negative for chest pain and palpitations.   Gastrointestinal:  Negative for blood in stool, constipation, diarrhea and vomiting.   Endocrine: Negative for polydipsia and polyuria.   Genitourinary:  Negative for difficulty urinating, dysuria, hematuria and menstrual problem.   Musculoskeletal:  Positive for arthralgias and back pain. Negative for joint swelling and neck pain.        Bl feet swelling    Neurological:  Negative for weakness and headaches.   Psychiatric/Behavioral:  Negative for confusion and dysphoric mood.         Objective     Physical Exam  Vitals and nursing note reviewed.   Constitutional:       Appearance: Normal appearance.   Cardiovascular:      Rate and Rhythm: Normal rate and regular rhythm.      Heart sounds: Normal heart sounds.   Pulmonary:      Effort: Pulmonary effort is normal.      Breath sounds: Normal breath sounds.   Musculoskeletal:         General: Normal range of motion.   Neurological:      Mental Status: She is alert and oriented to person, place, and time.   Psychiatric:         Mood and Affect: Mood normal.         Behavior: Behavior normal.          Assessment and Plan     1. Encounter for general adult medical examination with abnormal findings    2. Need for hepatitis C screening test  -     Hepatitis C Antibody; Future; Expected date: 06/27/2023    3. Screening for diabetes  mellitus    4. Encounter for screening for lipoid disorders  -     Lipid Panel; Future; Expected date: 06/27/2023    5. BMI 36.0-36.9,adult    6. Hypothyroidism, unspecified type  -     CBC Auto Differential; Future; Expected date: 06/27/2023  -     Comprehensive Metabolic Panel; Future; Expected date: 06/27/2023  -     Lipid Panel; Future; Expected date: 06/27/2023  -     TSH; Future; Expected date: 06/27/2023    7. Screening for HIV (human immunodeficiency virus)  -     HIV 1/2 Ag/Ab (4th Gen); Future; Expected date: 06/27/2023    8. Localized swelling of both lower legs  -     hydroCHLOROthiazide (HYDRODIURIL) 12.5 MG Tab; Take 1 tablet (12.5 mg total) by mouth daily as needed (swelling).  Dispense: 90 tablet; Refill: 1    9. Chronic bilateral low back pain with bilateral sciatica  -     meloxicam (MOBIC) 15 MG tablet; Take 1 tablet (15 mg total) by mouth once daily.  Dispense: 90 tablet; Refill: 1        Will call pt with lab results. Notify clinic if she wants referrals for physical therapy or pain treatment for low back pain.          Follow up if symptoms worsen or fail to improve.

## 2023-06-29 ENCOUNTER — OFFICE VISIT (OUTPATIENT)
Dept: OBSTETRICS AND GYNECOLOGY | Facility: CLINIC | Age: 33
End: 2023-06-29
Payer: COMMERCIAL

## 2023-06-29 VITALS
OXYGEN SATURATION: 98 % | WEIGHT: 215 LBS | SYSTOLIC BLOOD PRESSURE: 119 MMHG | HEIGHT: 64 IN | BODY MASS INDEX: 36.7 KG/M2 | DIASTOLIC BLOOD PRESSURE: 81 MMHG | HEART RATE: 87 BPM

## 2023-06-29 DIAGNOSIS — Z30.42 ENCOUNTER FOR SURVEILLANCE OF INJECTABLE CONTRACEPTIVE: Primary | ICD-10-CM

## 2023-06-29 DIAGNOSIS — Z91.89 AT RISK FOR SEXUALLY TRANSMITTED DISEASE DUE TO UNPROTECTED SEX: ICD-10-CM

## 2023-06-29 DIAGNOSIS — Z20.2 ENCOUNTER FOR ASSESSMENT OF STD EXPOSURE: ICD-10-CM

## 2023-06-29 PROCEDURE — 3074F PR MOST RECENT SYSTOLIC BLOOD PRESSURE < 130 MM HG: ICD-10-PCS | Mod: CPTII,,, | Performed by: ADVANCED PRACTICE MIDWIFE

## 2023-06-29 PROCEDURE — 99213 PR OFFICE/OUTPT VISIT, EST, LEVL III, 20-29 MIN: ICD-10-PCS | Mod: 25,,, | Performed by: ADVANCED PRACTICE MIDWIFE

## 2023-06-29 PROCEDURE — 3044F PR MOST RECENT HEMOGLOBIN A1C LEVEL <7.0%: ICD-10-PCS | Mod: CPTII,,, | Performed by: ADVANCED PRACTICE MIDWIFE

## 2023-06-29 PROCEDURE — 3074F SYST BP LT 130 MM HG: CPT | Mod: CPTII,,, | Performed by: ADVANCED PRACTICE MIDWIFE

## 2023-06-29 PROCEDURE — 1159F PR MEDICATION LIST DOCUMENTED IN MEDICAL RECORD: ICD-10-PCS | Mod: CPTII,,, | Performed by: ADVANCED PRACTICE MIDWIFE

## 2023-06-29 PROCEDURE — 3079F PR MOST RECENT DIASTOLIC BLOOD PRESSURE 80-89 MM HG: ICD-10-PCS | Mod: CPTII,,, | Performed by: ADVANCED PRACTICE MIDWIFE

## 2023-06-29 PROCEDURE — 87591 CHLAMYDIA/GONORRHOEAE(GC), PCR: ICD-10-PCS | Mod: GZ,,, | Performed by: CLINICAL MEDICAL LABORATORY

## 2023-06-29 PROCEDURE — 96372 THER/PROPH/DIAG INJ SC/IM: CPT | Mod: ,,, | Performed by: ADVANCED PRACTICE MIDWIFE

## 2023-06-29 PROCEDURE — 87491 CHLAMYDIA/GONORRHOEAE(GC), PCR: ICD-10-PCS | Mod: GZ,,, | Performed by: CLINICAL MEDICAL LABORATORY

## 2023-06-29 PROCEDURE — 87591 N.GONORRHOEAE DNA AMP PROB: CPT | Mod: GZ,,, | Performed by: CLINICAL MEDICAL LABORATORY

## 2023-06-29 PROCEDURE — 99213 OFFICE O/P EST LOW 20 MIN: CPT | Mod: 25,,, | Performed by: ADVANCED PRACTICE MIDWIFE

## 2023-06-29 PROCEDURE — 96372 PR INJECTION,THERAP/PROPH/DIAG2ST, IM OR SUBCUT: ICD-10-PCS | Mod: ,,, | Performed by: ADVANCED PRACTICE MIDWIFE

## 2023-06-29 PROCEDURE — 1159F MED LIST DOCD IN RCRD: CPT | Mod: CPTII,,, | Performed by: ADVANCED PRACTICE MIDWIFE

## 2023-06-29 PROCEDURE — 3044F HG A1C LEVEL LT 7.0%: CPT | Mod: CPTII,,, | Performed by: ADVANCED PRACTICE MIDWIFE

## 2023-06-29 PROCEDURE — 3008F BODY MASS INDEX DOCD: CPT | Mod: CPTII,,, | Performed by: ADVANCED PRACTICE MIDWIFE

## 2023-06-29 PROCEDURE — 3008F PR BODY MASS INDEX (BMI) DOCUMENTED: ICD-10-PCS | Mod: CPTII,,, | Performed by: ADVANCED PRACTICE MIDWIFE

## 2023-06-29 PROCEDURE — 87491 CHLMYD TRACH DNA AMP PROBE: CPT | Mod: GZ,,, | Performed by: CLINICAL MEDICAL LABORATORY

## 2023-06-29 PROCEDURE — 3079F DIAST BP 80-89 MM HG: CPT | Mod: CPTII,,, | Performed by: ADVANCED PRACTICE MIDWIFE

## 2023-06-29 RX ORDER — MEDROXYPROGESTERONE ACETATE 150 MG/ML
150 INJECTION, SUSPENSION INTRAMUSCULAR ONCE
Status: COMPLETED | OUTPATIENT
Start: 2023-06-29 | End: 2023-06-29

## 2023-06-29 RX ADMIN — MEDROXYPROGESTERONE ACETATE 150 MG: 150 INJECTION, SUSPENSION INTRAMUSCULAR at 08:06

## 2023-06-29 NOTE — PROGRESS NOTES
"  Patient ID:  Taisha Rivera is a 33 y.o. female      Chief Complaint:   Chief Complaint   Patient presents with    depo      Last pap : 12/3/21 normal         HPI:  Taisha Rivera is in for repeat depo injection within dates. Denies ACHES, vag discharge, and abnormal vag bleeding. No issues, problems, or complaints. Desires to continue depo injections. Agrees to yearly GC/CT testing. Has had recent wellness exam, HgbA1C slightly elevated at 5.8  LMP: No LMP recorded (lmp unknown). Patient has had an injection.  Sexually active: yes    Past Medical History:   Diagnosis Date    COVID-19     Headache        History reviewed. No pertinent surgical history.    OB History          3    Para   3    Term                AB        Living             SAB        IAB        Ectopic        Multiple        Live Births                     /81 (BP Location: Right arm, Patient Position: Sitting, BP Method: Large (Automatic))   Pulse 87   Ht 5' 4" (1.626 m)   Wt 97.5 kg (215 lb)   LMP  (LMP Unknown)   SpO2 98%   BMI 36.90 kg/m²   Wt Readings from Last 3 Encounters:   23 97.5 kg (215 lb)   23 97.1 kg (214 lb)   23 94.3 kg (207 lb 12.8 oz)          ROS:  Review of Systems   Constitutional: Negative.    Eyes: Negative.    Respiratory: Negative.     Cardiovascular: Negative.    Gastrointestinal: Negative.    Genitourinary: Negative.    Musculoskeletal: Negative.    Neurological: Negative.    Psychiatric/Behavioral: Negative.          PHYSICAL EXAM:  Physical Exam  Constitutional:       Appearance: Normal appearance. She is obese.   Eyes:      Extraocular Movements: Extraocular movements intact.   Cardiovascular:      Rate and Rhythm: Normal rate.      Pulses: Normal pulses.   Pulmonary:      Effort: Pulmonary effort is normal.   Musculoskeletal:         General: Normal range of motion.      Cervical back: Normal range of motion.   Skin:     General: Skin is warm and dry. " "  Neurological:      Mental Status: She is alert and oriented to person, place, and time.   Psychiatric:         Mood and Affect: Mood normal.         Behavior: Behavior normal.         Thought Content: Thought content normal.         Judgment: Judgment normal.        Assessment:  Encounter for surveillance of injectable contraceptive  -     medroxyPROGESTERone (DEPO-PROVERA) syringe 150 mg    Encounter for assessment of STD exposure  -     Chlamydia/GC, PCR; Future; Expected date: 06/29/2023    At risk for sexually transmitted disease due to unprotected sex  -     Chlamydia/GC, PCR; Future; Expected date: 06/29/2023    BMI 36.0-36.9,adult          ICD-10-CM ICD-9-CM    1. Encounter for surveillance of injectable contraceptive  Z30.42 V25.49 medroxyPROGESTERone (DEPO-PROVERA) syringe 150 mg      2. Encounter for assessment of STD exposure  Z76.89 V72.85 Chlamydia/GC, PCR      Chlamydia/GC, PCR      3. At risk for sexually transmitted disease due to unprotected sex  Z91.89 V15.89 Chlamydia/GC, PCR      Chlamydia/GC, PCR      4. BMI 36.0-36.9,adult  Z68.36 V85.36           Plan:  Urine sent for GC/CT testing, will call or send My Chart message after results reviewed  Depo Provera 150 mg IM given, Reviewed "ACHES" of contraceptives and possible S/E  Encouraged Vitamin D and calcium supplements or daily multivitamin  Instructed on condom use during each sexual encounter to decrease STD exposure risk    Follow up in about 3 months (around 9/29/2023) for repeat depo injection.                    "

## 2023-06-30 LAB
CHLAMYDIA BY PCR: NEGATIVE
N. GONORRHOEAE (GC) BY PCR: NEGATIVE

## 2023-07-19 ENCOUNTER — PATIENT MESSAGE (OUTPATIENT)
Dept: RESEARCH | Facility: HOSPITAL | Age: 33
End: 2023-07-19

## 2023-07-24 ENCOUNTER — PATIENT MESSAGE (OUTPATIENT)
Dept: RESEARCH | Facility: HOSPITAL | Age: 33
End: 2023-07-24

## 2023-09-11 ENCOUNTER — OFFICE VISIT (OUTPATIENT)
Dept: OBSTETRICS AND GYNECOLOGY | Facility: CLINIC | Age: 33
End: 2023-09-11
Payer: COMMERCIAL

## 2023-09-11 VITALS
OXYGEN SATURATION: 99 % | WEIGHT: 214 LBS | HEART RATE: 98 BPM | RESPIRATION RATE: 17 BRPM | HEIGHT: 64 IN | DIASTOLIC BLOOD PRESSURE: 82 MMHG | TEMPERATURE: 99 F | SYSTOLIC BLOOD PRESSURE: 125 MMHG | BODY MASS INDEX: 36.54 KG/M2

## 2023-09-11 DIAGNOSIS — Z30.42 ENCOUNTER FOR SURVEILLANCE OF INJECTABLE CONTRACEPTIVE: Primary | ICD-10-CM

## 2023-09-11 PROCEDURE — 3079F DIAST BP 80-89 MM HG: CPT | Mod: CPTII,,, | Performed by: ADVANCED PRACTICE MIDWIFE

## 2023-09-11 PROCEDURE — 1159F MED LIST DOCD IN RCRD: CPT | Mod: CPTII,,, | Performed by: ADVANCED PRACTICE MIDWIFE

## 2023-09-11 PROCEDURE — 96372 THER/PROPH/DIAG INJ SC/IM: CPT | Mod: ,,, | Performed by: ADVANCED PRACTICE MIDWIFE

## 2023-09-11 PROCEDURE — 3074F PR MOST RECENT SYSTOLIC BLOOD PRESSURE < 130 MM HG: ICD-10-PCS | Mod: CPTII,,, | Performed by: ADVANCED PRACTICE MIDWIFE

## 2023-09-11 PROCEDURE — 3008F PR BODY MASS INDEX (BMI) DOCUMENTED: ICD-10-PCS | Mod: CPTII,,, | Performed by: ADVANCED PRACTICE MIDWIFE

## 2023-09-11 PROCEDURE — 3008F BODY MASS INDEX DOCD: CPT | Mod: CPTII,,, | Performed by: ADVANCED PRACTICE MIDWIFE

## 2023-09-11 PROCEDURE — 3079F PR MOST RECENT DIASTOLIC BLOOD PRESSURE 80-89 MM HG: ICD-10-PCS | Mod: CPTII,,, | Performed by: ADVANCED PRACTICE MIDWIFE

## 2023-09-11 PROCEDURE — 3044F PR MOST RECENT HEMOGLOBIN A1C LEVEL <7.0%: ICD-10-PCS | Mod: CPTII,,, | Performed by: ADVANCED PRACTICE MIDWIFE

## 2023-09-11 PROCEDURE — 99212 OFFICE O/P EST SF 10 MIN: CPT | Mod: 25,,, | Performed by: ADVANCED PRACTICE MIDWIFE

## 2023-09-11 PROCEDURE — 96372 PR INJECTION,THERAP/PROPH/DIAG2ST, IM OR SUBCUT: ICD-10-PCS | Mod: ,,, | Performed by: ADVANCED PRACTICE MIDWIFE

## 2023-09-11 PROCEDURE — 1159F PR MEDICATION LIST DOCUMENTED IN MEDICAL RECORD: ICD-10-PCS | Mod: CPTII,,, | Performed by: ADVANCED PRACTICE MIDWIFE

## 2023-09-11 PROCEDURE — 99212 PR OFFICE/OUTPT VISIT, EST, LEVL II, 10-19 MIN: ICD-10-PCS | Mod: 25,,, | Performed by: ADVANCED PRACTICE MIDWIFE

## 2023-09-11 PROCEDURE — 3074F SYST BP LT 130 MM HG: CPT | Mod: CPTII,,, | Performed by: ADVANCED PRACTICE MIDWIFE

## 2023-09-11 PROCEDURE — 3044F HG A1C LEVEL LT 7.0%: CPT | Mod: CPTII,,, | Performed by: ADVANCED PRACTICE MIDWIFE

## 2023-09-11 RX ORDER — MEDROXYPROGESTERONE ACETATE 150 MG/ML
150 INJECTION, SUSPENSION INTRAMUSCULAR ONCE
Status: COMPLETED | OUTPATIENT
Start: 2023-09-11 | End: 2023-09-11

## 2023-09-11 RX ADMIN — MEDROXYPROGESTERONE ACETATE 150 MG: 150 INJECTION, SUSPENSION INTRAMUSCULAR at 01:09

## 2023-09-11 NOTE — PROGRESS NOTES
"  Patient ID:  Taisha Rivera is a 33 y.o. female      Chief Complaint:   Chief Complaint   Patient presents with    Contraception     Depo        HPI:  Taisha Rivera is in for repeat depo injection within dates. Denies ACHES, vag discharge, and abnormal vag bleeding. No issues, problems, or complaints. Desires to continue depo injections.   LMP: No LMP recorded. Patient has had an injection.  Sexually active: yes, declines STD testing    Past Medical History:   Diagnosis Date    COVID-19     Headache        History reviewed. No pertinent surgical history.    OB History          3    Para   3    Term                AB        Living             SAB        IAB        Ectopic        Multiple        Live Births                     /82 (BP Location: Right arm)   Pulse 98   Temp 98.7 °F (37.1 °C)   Resp 17   Ht 5' 4" (1.626 m)   Wt 97.1 kg (214 lb)   SpO2 99%   BMI 36.73 kg/m²   Wt Readings from Last 3 Encounters:   23 97.1 kg (214 lb)   23 97.5 kg (215 lb)   23 97.1 kg (214 lb)          ROS:  Review of Systems   Constitutional: Negative.    Eyes: Negative.    Respiratory: Negative.     Cardiovascular: Negative.    Gastrointestinal: Negative.    Genitourinary: Negative.    Musculoskeletal: Negative.    Neurological: Negative.    Psychiatric/Behavioral: Negative.            PHYSICAL EXAM:  Physical Exam  Constitutional:       Appearance: Normal appearance. She is obese.   Eyes:      Extraocular Movements: Extraocular movements intact.   Cardiovascular:      Rate and Rhythm: Normal rate.      Pulses: Normal pulses.   Pulmonary:      Effort: Pulmonary effort is normal.   Musculoskeletal:         General: Normal range of motion.      Cervical back: Normal range of motion.   Skin:     General: Skin is warm and dry.   Neurological:      Mental Status: She is alert and oriented to person, place, and time.   Psychiatric:         Mood and Affect: Mood normal.         " "Behavior: Behavior normal.         Thought Content: Thought content normal.         Judgment: Judgment normal.          Assessment:  Encounter for surveillance of injectable contraceptive  -     medroxyPROGESTERone (DEPO-PROVERA) syringe 150 mg    BMI 36.0-36.9,adult          ICD-10-CM ICD-9-CM    1. Encounter for surveillance of injectable contraceptive  Z30.42 V25.49 medroxyPROGESTERone (DEPO-PROVERA) syringe 150 mg      2. BMI 36.0-36.9,adult  Z68.36 V85.36           Plan:  Depo Provera 150 mg IM given, Reviewed "ACHES" of contraceptives and possible S/E  Encouraged Vitamin D and calcium supplements or daily multivitamin  Instructed on condom use during each sexual encounter to decrease STD exposure risk    Follow up in about 3 months (around 12/11/2023) for repeat depo injection.                    "

## 2023-10-02 ENCOUNTER — OFFICE VISIT (OUTPATIENT)
Dept: PRIMARY CARE CLINIC | Facility: CLINIC | Age: 33
End: 2023-10-02
Payer: COMMERCIAL

## 2023-10-02 VITALS
HEIGHT: 64 IN | BODY MASS INDEX: 36.7 KG/M2 | TEMPERATURE: 99 F | SYSTOLIC BLOOD PRESSURE: 120 MMHG | HEART RATE: 91 BPM | OXYGEN SATURATION: 99 % | DIASTOLIC BLOOD PRESSURE: 78 MMHG | WEIGHT: 215 LBS

## 2023-10-02 DIAGNOSIS — R05.9 COUGH, UNSPECIFIED TYPE: Primary | ICD-10-CM

## 2023-10-02 DIAGNOSIS — J30.2 SEASONAL ALLERGIC RHINITIS, UNSPECIFIED TRIGGER: ICD-10-CM

## 2023-10-02 PROBLEM — N39.0 URINARY TRACT INFECTION WITHOUT HEMATURIA: Status: RESOLVED | Noted: 2022-02-28 | Resolved: 2023-10-02

## 2023-10-02 PROCEDURE — 3008F PR BODY MASS INDEX (BMI) DOCUMENTED: ICD-10-PCS | Mod: CPTII,,, | Performed by: NURSE PRACTITIONER

## 2023-10-02 PROCEDURE — 1160F PR REVIEW ALL MEDS BY PRESCRIBER/CLIN PHARMACIST DOCUMENTED: ICD-10-PCS | Mod: CPTII,,, | Performed by: NURSE PRACTITIONER

## 2023-10-02 PROCEDURE — 1160F RVW MEDS BY RX/DR IN RCRD: CPT | Mod: CPTII,,, | Performed by: NURSE PRACTITIONER

## 2023-10-02 PROCEDURE — 3044F HG A1C LEVEL LT 7.0%: CPT | Mod: CPTII,,, | Performed by: NURSE PRACTITIONER

## 2023-10-02 PROCEDURE — 3008F BODY MASS INDEX DOCD: CPT | Mod: CPTII,,, | Performed by: NURSE PRACTITIONER

## 2023-10-02 PROCEDURE — 99213 PR OFFICE/OUTPT VISIT, EST, LEVL III, 20-29 MIN: ICD-10-PCS | Mod: ,,, | Performed by: NURSE PRACTITIONER

## 2023-10-02 PROCEDURE — 99213 OFFICE O/P EST LOW 20 MIN: CPT | Mod: ,,, | Performed by: NURSE PRACTITIONER

## 2023-10-02 PROCEDURE — 3078F PR MOST RECENT DIASTOLIC BLOOD PRESSURE < 80 MM HG: ICD-10-PCS | Mod: CPTII,,, | Performed by: NURSE PRACTITIONER

## 2023-10-02 PROCEDURE — 1159F PR MEDICATION LIST DOCUMENTED IN MEDICAL RECORD: ICD-10-PCS | Mod: CPTII,,, | Performed by: NURSE PRACTITIONER

## 2023-10-02 PROCEDURE — 3044F PR MOST RECENT HEMOGLOBIN A1C LEVEL <7.0%: ICD-10-PCS | Mod: CPTII,,, | Performed by: NURSE PRACTITIONER

## 2023-10-02 PROCEDURE — 3078F DIAST BP <80 MM HG: CPT | Mod: CPTII,,, | Performed by: NURSE PRACTITIONER

## 2023-10-02 PROCEDURE — 3074F PR MOST RECENT SYSTOLIC BLOOD PRESSURE < 130 MM HG: ICD-10-PCS | Mod: CPTII,,, | Performed by: NURSE PRACTITIONER

## 2023-10-02 PROCEDURE — 1159F MED LIST DOCD IN RCRD: CPT | Mod: CPTII,,, | Performed by: NURSE PRACTITIONER

## 2023-10-02 PROCEDURE — 3074F SYST BP LT 130 MM HG: CPT | Mod: CPTII,,, | Performed by: NURSE PRACTITIONER

## 2023-10-02 RX ORDER — AMOXICILLIN 500 MG/1
CAPSULE ORAL
COMMUNITY
Start: 2023-09-28 | End: 2024-01-19 | Stop reason: ALTCHOICE

## 2023-10-02 RX ORDER — METHYLPREDNISOLONE 4 MG/1
TABLET ORAL
Qty: 21 EACH | Refills: 0 | Status: SHIPPED | OUTPATIENT
Start: 2023-10-02 | End: 2023-10-23

## 2023-10-02 RX ORDER — PROMETHAZINE HYDROCHLORIDE AND DEXTROMETHORPHAN HYDROBROMIDE 6.25; 15 MG/5ML; MG/5ML
5 SYRUP ORAL EVERY 4 HOURS PRN
Qty: 240 ML | Refills: 0 | Status: SHIPPED | OUTPATIENT
Start: 2023-10-02 | End: 2023-10-12

## 2023-10-02 NOTE — PROGRESS NOTES
Subjective     Patient ID: Taisha Rivera is a 33 y.o. female.    Chief Complaint: Sore Throat (X 3 days) and Cough    Pt presents with sore throat and cough x 3 days. Pt is currently taking amoxicillin.       Review of Systems   Constitutional:  Negative for activity change, appetite change, chills, fatigue and fever.   HENT:  Positive for sore throat. Negative for nasal congestion, ear discharge, nosebleeds, postnasal drip, rhinorrhea, sinus pressure/congestion, sneezing and tinnitus.    Eyes:  Negative for pain, discharge, redness and itching.   Respiratory:  Positive for cough. Negative for choking, chest tightness, shortness of breath and wheezing.    Cardiovascular:  Negative for chest pain.   Gastrointestinal:  Negative for abdominal distention, abdominal pain, blood in stool, change in bowel habit, constipation, diarrhea, nausea and vomiting.   Genitourinary:  Negative for decreased urine volume, dysuria, flank pain, frequency, menstrual irregularity and menstrual problem.   Musculoskeletal:  Negative for back pain and gait problem.   Integumentary:  Negative for wound, breast mass and breast discharge.   Allergic/Immunologic: Negative for immunocompromised state.   Neurological:  Negative for dizziness, light-headedness and headaches.   Psychiatric/Behavioral:  Negative for agitation, behavioral problems and hallucinations.    Breast: Negative for mass         Objective     Physical Exam  Vitals and nursing note reviewed.   Constitutional:       Appearance: Normal appearance.   HENT:      Head: Normocephalic.      Right Ear: Tympanic membrane normal.      Left Ear: Tympanic membrane normal.      Nose: Nose normal.      Mouth/Throat:      Mouth: Mucous membranes are moist.   Eyes:      Conjunctiva/sclera: Conjunctivae normal.   Cardiovascular:      Rate and Rhythm: Normal rate and regular rhythm.      Heart sounds: Normal heart sounds.   Pulmonary:      Effort: Pulmonary effort is normal.       Breath sounds: Normal breath sounds.   Musculoskeletal:         General: Normal range of motion.   Neurological:      Mental Status: She is alert and oriented to person, place, and time.   Psychiatric:         Mood and Affect: Mood normal.         Behavior: Behavior normal.            Assessment and Plan     1. Cough, unspecified type  -     POCT COVID-19 Rapid Screening    2. Seasonal allergic rhinitis, unspecified trigger  -     methylPREDNISolone (MEDROL DOSEPACK) 4 mg tablet; use as directed  Dispense: 21 each; Refill: 0  -     promethazine-dextromethorphan (PROMETHAZINE-DM) 6.25-15 mg/5 mL Syrp; Take 5 mLs by mouth every 4 (four) hours as needed (cough). Do not take while driving  Dispense: 240 mL; Refill: 0        Rapid covid is negative. Notify clinic if symptoms worsen or persist.          Follow up if symptoms worsen or fail to improve.

## 2023-10-04 ENCOUNTER — OFFICE VISIT (OUTPATIENT)
Dept: FAMILY MEDICINE | Facility: CLINIC | Age: 33
End: 2023-10-04
Payer: COMMERCIAL

## 2023-10-04 VITALS
RESPIRATION RATE: 18 BRPM | SYSTOLIC BLOOD PRESSURE: 112 MMHG | HEART RATE: 88 BPM | HEIGHT: 64 IN | WEIGHT: 216 LBS | TEMPERATURE: 99 F | BODY MASS INDEX: 36.88 KG/M2 | OXYGEN SATURATION: 98 % | DIASTOLIC BLOOD PRESSURE: 77 MMHG

## 2023-10-04 DIAGNOSIS — J01.90 ACUTE NON-RECURRENT SINUSITIS, UNSPECIFIED LOCATION: ICD-10-CM

## 2023-10-04 DIAGNOSIS — Z20.822 ENCOUNTER FOR LABORATORY TESTING FOR COVID-19 VIRUS: Primary | ICD-10-CM

## 2023-10-04 LAB
CTP QC/QA: YES
FLUAV AG NPH QL: NEGATIVE
FLUBV AG NPH QL: NEGATIVE
SARS-COV-2 AG RESP QL IA.RAPID: NEGATIVE

## 2023-10-04 PROCEDURE — 99213 OFFICE O/P EST LOW 20 MIN: CPT | Mod: ,,, | Performed by: FAMILY MEDICINE

## 2023-10-04 PROCEDURE — 3078F PR MOST RECENT DIASTOLIC BLOOD PRESSURE < 80 MM HG: ICD-10-PCS | Mod: CPTII,,, | Performed by: FAMILY MEDICINE

## 2023-10-04 PROCEDURE — 3044F PR MOST RECENT HEMOGLOBIN A1C LEVEL <7.0%: ICD-10-PCS | Mod: CPTII,,, | Performed by: FAMILY MEDICINE

## 2023-10-04 PROCEDURE — 87428 SARSCOV & INF VIR A&B AG IA: CPT | Mod: QW,,, | Performed by: FAMILY MEDICINE

## 2023-10-04 PROCEDURE — 3078F DIAST BP <80 MM HG: CPT | Mod: CPTII,,, | Performed by: FAMILY MEDICINE

## 2023-10-04 PROCEDURE — 1159F MED LIST DOCD IN RCRD: CPT | Mod: CPTII,,, | Performed by: FAMILY MEDICINE

## 2023-10-04 PROCEDURE — 1159F PR MEDICATION LIST DOCUMENTED IN MEDICAL RECORD: ICD-10-PCS | Mod: CPTII,,, | Performed by: FAMILY MEDICINE

## 2023-10-04 PROCEDURE — 3074F SYST BP LT 130 MM HG: CPT | Mod: CPTII,,, | Performed by: FAMILY MEDICINE

## 2023-10-04 PROCEDURE — 99213 PR OFFICE/OUTPT VISIT, EST, LEVL III, 20-29 MIN: ICD-10-PCS | Mod: ,,, | Performed by: FAMILY MEDICINE

## 2023-10-04 PROCEDURE — 87428 POCT SARS-COV2 (COVID) WITH FLU ANTIGEN: ICD-10-PCS | Mod: QW,,, | Performed by: FAMILY MEDICINE

## 2023-10-04 PROCEDURE — 3074F PR MOST RECENT SYSTOLIC BLOOD PRESSURE < 130 MM HG: ICD-10-PCS | Mod: CPTII,,, | Performed by: FAMILY MEDICINE

## 2023-10-04 PROCEDURE — 3008F BODY MASS INDEX DOCD: CPT | Mod: CPTII,,, | Performed by: FAMILY MEDICINE

## 2023-10-04 PROCEDURE — 3008F PR BODY MASS INDEX (BMI) DOCUMENTED: ICD-10-PCS | Mod: CPTII,,, | Performed by: FAMILY MEDICINE

## 2023-10-04 PROCEDURE — 3044F HG A1C LEVEL LT 7.0%: CPT | Mod: CPTII,,, | Performed by: FAMILY MEDICINE

## 2023-10-04 RX ORDER — AZITHROMYCIN 250 MG/1
TABLET, FILM COATED ORAL
Qty: 6 TABLET | Refills: 0 | Status: SHIPPED | OUTPATIENT
Start: 2023-10-04 | End: 2024-01-19

## 2023-10-04 NOTE — LETTER
October 4, 2023      [facility]  [address] [facility] [place] [address]  Phone: [phone]  Fax: [phone]       Patient: Taisha Rivera   YOB: 1990  Date of Visit: 10/04/2023    To Whom It May Concern:    Demar Rivera  was at [facility] on 10/04/2023. The patient may return to work/school on 10/04/2023 with no restrictions. If you have any questions or concerns, or if I can be of further assistance, please do not hesitate to contact me.    Sincerely,    Dr. Ashvin Kaur III

## 2023-10-04 NOTE — PROGRESS NOTES
Subjective     Patient ID: Taisha Rivera is a 33 y.o. female.    Chief Complaint: Cough (X 3 days ), Nasal Congestion, and Headache    No fever or purulent sputum    Cough  Associated symptoms include headaches.   Headache   Associated symptoms include coughing.     Review of Systems   Respiratory:  Positive for cough.    Neurological:  Positive for headaches.          Objective     Physical Exam  Constitutional:       General: She is not in acute distress.     Appearance: She is not ill-appearing.   HENT:      Right Ear: Tympanic membrane normal.      Left Ear: Tympanic membrane normal.      Nose: Congestion and rhinorrhea present.      Mouth/Throat:      Pharynx: No posterior oropharyngeal erythema.   Cardiovascular:      Rate and Rhythm: Normal rate and regular rhythm.   Pulmonary:      Effort: Pulmonary effort is normal.      Breath sounds: Normal breath sounds.   Neurological:      Mental Status: She is alert.          Assessment and Plan     1. Encounter for laboratory testing for COVID-19 virus  -     POCT SARS-COV2 (COVID) with Flu Antigen    2. Acute non-recurrent sinusitis, unspecified location    Other orders  -     azithromycin (ZITHROMAX Z-YAZMIN) 250 MG tablet; Two today then 1 daily  Dispense: 6 tablet; Refill: 0                 No follow-ups on file.

## 2023-11-30 ENCOUNTER — OFFICE VISIT (OUTPATIENT)
Dept: OBSTETRICS AND GYNECOLOGY | Facility: CLINIC | Age: 33
End: 2023-11-30
Payer: COMMERCIAL

## 2023-11-30 VITALS
DIASTOLIC BLOOD PRESSURE: 85 MMHG | WEIGHT: 218.19 LBS | HEIGHT: 64 IN | TEMPERATURE: 99 F | OXYGEN SATURATION: 99 % | HEART RATE: 105 BPM | RESPIRATION RATE: 17 BRPM | BODY MASS INDEX: 37.25 KG/M2 | SYSTOLIC BLOOD PRESSURE: 126 MMHG

## 2023-11-30 DIAGNOSIS — Z30.42 SURVEILLANCE OF CONTRACEPTIVE INJECTION: Primary | ICD-10-CM

## 2023-11-30 PROCEDURE — 1159F PR MEDICATION LIST DOCUMENTED IN MEDICAL RECORD: ICD-10-PCS | Mod: CPTII,,, | Performed by: ADVANCED PRACTICE MIDWIFE

## 2023-11-30 PROCEDURE — 3074F PR MOST RECENT SYSTOLIC BLOOD PRESSURE < 130 MM HG: ICD-10-PCS | Mod: CPTII,,, | Performed by: ADVANCED PRACTICE MIDWIFE

## 2023-11-30 PROCEDURE — 3044F PR MOST RECENT HEMOGLOBIN A1C LEVEL <7.0%: ICD-10-PCS | Mod: CPTII,,, | Performed by: ADVANCED PRACTICE MIDWIFE

## 2023-11-30 PROCEDURE — 96372 PR INJECTION,THERAP/PROPH/DIAG2ST, IM OR SUBCUT: ICD-10-PCS | Mod: ,,, | Performed by: ADVANCED PRACTICE MIDWIFE

## 2023-11-30 PROCEDURE — 3044F HG A1C LEVEL LT 7.0%: CPT | Mod: CPTII,,, | Performed by: ADVANCED PRACTICE MIDWIFE

## 2023-11-30 PROCEDURE — 3074F SYST BP LT 130 MM HG: CPT | Mod: CPTII,,, | Performed by: ADVANCED PRACTICE MIDWIFE

## 2023-11-30 PROCEDURE — 99212 OFFICE O/P EST SF 10 MIN: CPT | Mod: 25,,, | Performed by: ADVANCED PRACTICE MIDWIFE

## 2023-11-30 PROCEDURE — 3079F PR MOST RECENT DIASTOLIC BLOOD PRESSURE 80-89 MM HG: ICD-10-PCS | Mod: CPTII,,, | Performed by: ADVANCED PRACTICE MIDWIFE

## 2023-11-30 PROCEDURE — 3008F BODY MASS INDEX DOCD: CPT | Mod: CPTII,,, | Performed by: ADVANCED PRACTICE MIDWIFE

## 2023-11-30 PROCEDURE — 1159F MED LIST DOCD IN RCRD: CPT | Mod: CPTII,,, | Performed by: ADVANCED PRACTICE MIDWIFE

## 2023-11-30 PROCEDURE — 3079F DIAST BP 80-89 MM HG: CPT | Mod: CPTII,,, | Performed by: ADVANCED PRACTICE MIDWIFE

## 2023-11-30 PROCEDURE — 3008F PR BODY MASS INDEX (BMI) DOCUMENTED: ICD-10-PCS | Mod: CPTII,,, | Performed by: ADVANCED PRACTICE MIDWIFE

## 2023-11-30 PROCEDURE — 99212 PR OFFICE/OUTPT VISIT, EST, LEVL II, 10-19 MIN: ICD-10-PCS | Mod: 25,,, | Performed by: ADVANCED PRACTICE MIDWIFE

## 2023-11-30 PROCEDURE — 96372 THER/PROPH/DIAG INJ SC/IM: CPT | Mod: ,,, | Performed by: ADVANCED PRACTICE MIDWIFE

## 2023-11-30 RX ORDER — MEDROXYPROGESTERONE ACETATE 150 MG/ML
150 INJECTION, SUSPENSION INTRAMUSCULAR ONCE
Status: COMPLETED | OUTPATIENT
Start: 2023-11-30 | End: 2023-11-30

## 2023-11-30 RX ADMIN — MEDROXYPROGESTERONE ACETATE 150 MG: 150 INJECTION, SUSPENSION INTRAMUSCULAR at 03:11

## 2023-11-30 NOTE — PROGRESS NOTES
"  Patient ID:  Taisha Rivera is a 33 y.o. female      Chief Complaint:   Chief Complaint   Patient presents with    Contraception     Depo        HPI:  Taisha Rivera is in for repeat depo injection within dates. Denies ACHES, vag discharge, and abnormal vag bleeding. No issues, problems, or complaints. Desires to continue depo injections.   LMP: No LMP recorded. Patient has had an injection.  Sexually active: yes, declines STD testing    Past Medical History:   Diagnosis Date    COVID-19     Headache     Urinary tract infection without hematuria 2022       History reviewed. No pertinent surgical history.    OB History          3    Para   3    Term                AB        Living             SAB        IAB        Ectopic        Multiple        Live Births                     /85 (BP Location: Left arm)   Pulse 105   Temp 98.5 °F (36.9 °C)   Resp 17   Ht 5' 4" (1.626 m)   Wt 99 kg (218 lb 3.2 oz)   SpO2 99%   BMI 37.45 kg/m²   Wt Readings from Last 3 Encounters:   23 99 kg (218 lb 3.2 oz)   10/04/23 98 kg (216 lb)   10/02/23 97.5 kg (215 lb)          ROS:  Review of Systems   Constitutional: Negative.    Eyes: Negative.    Respiratory: Negative.     Cardiovascular: Negative.    Gastrointestinal: Negative.    Genitourinary: Negative.    Musculoskeletal: Negative.    Neurological: Negative.    Psychiatric/Behavioral: Negative.            PHYSICAL EXAM:  Physical Exam  Constitutional:       Appearance: Normal appearance. She is obese.   Eyes:      Extraocular Movements: Extraocular movements intact.   Cardiovascular:      Rate and Rhythm: Normal rate.      Pulses: Normal pulses.   Pulmonary:      Effort: Pulmonary effort is normal.   Musculoskeletal:         General: Normal range of motion.      Cervical back: Normal range of motion.   Skin:     General: Skin is warm and dry.   Neurological:      Mental Status: She is alert and oriented to person, place, and " "time.   Psychiatric:         Mood and Affect: Mood normal.         Behavior: Behavior normal.         Thought Content: Thought content normal.         Judgment: Judgment normal.          Assessment:  Surveillance of contraceptive injection  -     medroxyPROGESTERone (DEPO-PROVERA) syringe 150 mg    BMI 37.0-37.9, adult          ICD-10-CM ICD-9-CM    1. Surveillance of contraceptive injection  Z30.42 V25.49 medroxyPROGESTERone (DEPO-PROVERA) syringe 150 mg      2. BMI 37.0-37.9, adult  Z68.37 V85.37           Plan:  Depo Provera 150 mg IM given, Reviewed "ACHES" of contraceptives and possible S/E  Encouraged Vitamin D and calcium supplements or daily multivitamin  Instructed on condom use during each sexual encounter to decrease STD exposure risk    Follow up in about 3 months (around 2/29/2024) for repeat depo injection.                    "

## 2024-02-22 ENCOUNTER — OFFICE VISIT (OUTPATIENT)
Dept: OBSTETRICS AND GYNECOLOGY | Facility: CLINIC | Age: 34
End: 2024-02-22
Payer: COMMERCIAL

## 2024-02-22 VITALS
RESPIRATION RATE: 17 BRPM | HEIGHT: 64 IN | WEIGHT: 220 LBS | BODY MASS INDEX: 37.56 KG/M2 | DIASTOLIC BLOOD PRESSURE: 80 MMHG | SYSTOLIC BLOOD PRESSURE: 117 MMHG | OXYGEN SATURATION: 99 % | HEART RATE: 98 BPM

## 2024-02-22 DIAGNOSIS — Z86.39 HISTORY OF HYPOTHYROIDISM: ICD-10-CM

## 2024-02-22 DIAGNOSIS — Z30.42 SURVEILLANCE OF CONTRACEPTIVE INJECTION: Primary | ICD-10-CM

## 2024-02-22 DIAGNOSIS — R63.5 WEIGHT GAIN: ICD-10-CM

## 2024-02-22 PROCEDURE — 99213 OFFICE O/P EST LOW 20 MIN: CPT | Mod: 25,,, | Performed by: ADVANCED PRACTICE MIDWIFE

## 2024-02-22 PROCEDURE — 3079F DIAST BP 80-89 MM HG: CPT | Mod: CPTII,,, | Performed by: ADVANCED PRACTICE MIDWIFE

## 2024-02-22 PROCEDURE — 3074F SYST BP LT 130 MM HG: CPT | Mod: CPTII,,, | Performed by: ADVANCED PRACTICE MIDWIFE

## 2024-02-22 PROCEDURE — 96372 THER/PROPH/DIAG INJ SC/IM: CPT | Mod: ,,, | Performed by: ADVANCED PRACTICE MIDWIFE

## 2024-02-22 PROCEDURE — 3008F BODY MASS INDEX DOCD: CPT | Mod: CPTII,,, | Performed by: ADVANCED PRACTICE MIDWIFE

## 2024-02-22 PROCEDURE — 1159F MED LIST DOCD IN RCRD: CPT | Mod: CPTII,,, | Performed by: ADVANCED PRACTICE MIDWIFE

## 2024-02-22 RX ORDER — MEDROXYPROGESTERONE ACETATE 150 MG/ML
150 INJECTION, SUSPENSION INTRAMUSCULAR ONCE
Status: COMPLETED | OUTPATIENT
Start: 2024-02-22 | End: 2024-02-22

## 2024-02-22 RX ADMIN — MEDROXYPROGESTERONE ACETATE 150 MG: 150 INJECTION, SUSPENSION INTRAMUSCULAR at 04:02

## 2024-02-22 NOTE — PROGRESS NOTES
"  Patient ID:  Taisha Rivera is a 33 y.o. female      Chief Complaint:   Chief Complaint   Patient presents with    Contraception     Depo        HPI:  Taisha Rivera is in for repeat depo injection within dates. Denies ACHES, vag discharge, and abnormal vag bleeding. No issues, problems, or complaints. Desires to continue depo injections. Concerned about weight gain but does not desire to stop depo. Discussed consult with Dr. Napier in [place] for weight loss management.   LMP: No LMP recorded. Patient has had an injection.  Sexually active: yes, declines STD testing    Past Medical History:   Diagnosis Date    COVID-19     Headache     Hypothyroidism, unspecified     Urinary tract infection without hematuria 2022       History reviewed. No pertinent surgical history.    OB History          3    Para   3    Term                AB        Living             SAB        IAB        Ectopic        Multiple        Live Births                     /80 (BP Location: Left arm)   Pulse 98   Resp 17   Ht 5' 4" (1.626 m)   Wt 99.8 kg (220 lb)   SpO2 99%   BMI 37.76 kg/m²   Wt Readings from Last 3 Encounters:   24 99.8 kg (220 lb)   02/15/24 100.1 kg (220 lb 9.6 oz)   23 99 kg (218 lb 3.2 oz)          ROS:  Review of Systems   Constitutional: Negative.    Eyes: Negative.    Respiratory: Negative.     Cardiovascular: Negative.    Gastrointestinal: Negative.    Genitourinary: Negative.    Musculoskeletal: Negative.    Neurological: Negative.    Psychiatric/Behavioral: Negative.            PHYSICAL EXAM:  Physical Exam  Constitutional:       Appearance: Normal appearance. She is obese.   Eyes:      Extraocular Movements: Extraocular movements intact.   Cardiovascular:      Rate and Rhythm: Normal rate.      Pulses: Normal pulses.   Pulmonary:      Effort: Pulmonary effort is normal.   Musculoskeletal:         General: Normal range of motion.      Cervical back: Normal " "range of motion.   Skin:     General: Skin is warm and dry.   Neurological:      Mental Status: She is alert and oriented to person, place, and time.   Psychiatric:         Mood and Affect: Mood normal.         Behavior: Behavior normal.         Thought Content: Thought content normal.         Judgment: Judgment normal.          Assessment:  Surveillance of contraceptive injection  -     medroxyPROGESTERone (DEPO-PROVERA) syringe 150 mg    BMI 37.0-37.9, adult    Weight gain    History of hypothyroidism          ICD-10-CM ICD-9-CM    1. Surveillance of contraceptive injection  Z30.42 V25.49 medroxyPROGESTERone (DEPO-PROVERA) syringe 150 mg      2. BMI 37.0-37.9, adult  Z68.37 V85.37       3. Weight gain  R63.5 783.1       4. History of hypothyroidism  Z86.39 V12.29           Plan:  Depo Provera 150 mg IM given, Reviewed "ACHES" of contraceptives and possible S/E  Encouraged Vitamin D and calcium supplements or daily multivitamin  Discussed contraceptive options including ring, pills, IUD and implant if desires to stop depo  Discussed consult with Dr. Napier in [place] for weight loss management  Due for annual exam and Pap this year    Follow up in about 3 months (around 5/22/2024) for repeat depo injection.                    "

## 2024-02-28 ENCOUNTER — PATIENT MESSAGE (OUTPATIENT)
Dept: PRIMARY CARE CLINIC | Facility: CLINIC | Age: 34
End: 2024-02-28
Payer: COMMERCIAL

## 2024-02-29 ENCOUNTER — PATIENT MESSAGE (OUTPATIENT)
Dept: PRIMARY CARE CLINIC | Facility: CLINIC | Age: 34
End: 2024-02-29
Payer: COMMERCIAL

## 2024-02-29 ENCOUNTER — OFFICE VISIT (OUTPATIENT)
Dept: PRIMARY CARE CLINIC | Facility: CLINIC | Age: 34
End: 2024-02-29
Payer: COMMERCIAL

## 2024-02-29 VITALS
TEMPERATURE: 100 F | DIASTOLIC BLOOD PRESSURE: 80 MMHG | WEIGHT: 221 LBS | SYSTOLIC BLOOD PRESSURE: 120 MMHG | OXYGEN SATURATION: 99 % | HEIGHT: 64 IN | HEART RATE: 98 BPM | BODY MASS INDEX: 37.73 KG/M2

## 2024-02-29 DIAGNOSIS — Z13.220 ENCOUNTER FOR SCREENING FOR LIPOID DISORDERS: ICD-10-CM

## 2024-02-29 DIAGNOSIS — E03.9 HYPOTHYROIDISM, UNSPECIFIED TYPE: ICD-10-CM

## 2024-02-29 DIAGNOSIS — R73.03 PRE-DIABETES: ICD-10-CM

## 2024-02-29 DIAGNOSIS — Z00.00 ROUTINE GENERAL MEDICAL EXAMINATION AT A HEALTH CARE FACILITY: Primary | ICD-10-CM

## 2024-02-29 PROCEDURE — 3079F DIAST BP 80-89 MM HG: CPT | Mod: CPTII,,, | Performed by: NURSE PRACTITIONER

## 2024-02-29 PROCEDURE — 3008F BODY MASS INDEX DOCD: CPT | Mod: CPTII,,, | Performed by: NURSE PRACTITIONER

## 2024-02-29 PROCEDURE — 3074F SYST BP LT 130 MM HG: CPT | Mod: CPTII,,, | Performed by: NURSE PRACTITIONER

## 2024-02-29 PROCEDURE — 1159F MED LIST DOCD IN RCRD: CPT | Mod: CPTII,,, | Performed by: NURSE PRACTITIONER

## 2024-02-29 PROCEDURE — 1160F RVW MEDS BY RX/DR IN RCRD: CPT | Mod: CPTII,,, | Performed by: NURSE PRACTITIONER

## 2024-02-29 PROCEDURE — 99395 PREV VISIT EST AGE 18-39: CPT | Mod: ,,, | Performed by: NURSE PRACTITIONER

## 2024-02-29 NOTE — PROGRESS NOTES
Subjective     Patient ID: Taisha Rivera is a 33 y.o. female.    Chief Complaint: Annual Exam    Pt presents for a wellness visit.       Review of Systems   Constitutional:  Negative for activity change, appetite change, chills, fatigue and fever.   HENT:  Negative for nasal congestion, ear discharge, nosebleeds, postnasal drip, rhinorrhea, sinus pressure/congestion, sneezing, sore throat and tinnitus.    Eyes:  Negative for pain, discharge, redness and itching.   Respiratory:  Negative for cough, choking, chest tightness, shortness of breath and wheezing.    Cardiovascular:  Negative for chest pain.   Gastrointestinal:  Negative for abdominal distention, abdominal pain, blood in stool, change in bowel habit, constipation, diarrhea, nausea and vomiting.   Genitourinary:  Negative for decreased urine volume, dysuria, flank pain, frequency, menstrual irregularity and menstrual problem.   Musculoskeletal:  Negative for back pain and gait problem.   Integumentary:  Negative for wound, breast mass and breast discharge.   Allergic/Immunologic: Negative for immunocompromised state.   Neurological:  Negative for dizziness, light-headedness and headaches.   Psychiatric/Behavioral:  Negative for agitation, behavioral problems and hallucinations.    Breast: Negative for mass         Objective     Physical Exam  Vitals and nursing note reviewed.   Constitutional:       Appearance: Normal appearance.   Cardiovascular:      Rate and Rhythm: Normal rate and regular rhythm.      Heart sounds: Normal heart sounds.   Pulmonary:      Effort: Pulmonary effort is normal.      Breath sounds: Normal breath sounds.   Musculoskeletal:         General: Normal range of motion.   Neurological:      Mental Status: She is alert and oriented to person, place, and time.   Psychiatric:         Mood and Affect: Mood normal.         Behavior: Behavior normal.            Assessment and Plan     1. Routine general medical examination at a  health care facility    2. BMI 37.0-37.9, adult    3. Pre-diabetes  -     Hemoglobin A1C; Future; Expected date: 02/29/2024    4. Hypothyroidism, unspecified type  -     CBC Auto Differential; Future; Expected date: 02/29/2024  -     Comprehensive Metabolic Panel; Future; Expected date: 02/29/2024  -     TSH; Future; Expected date: 02/29/2024    5. Encounter for screening for lipoid disorders  -     Lipid Panel; Future; Expected date: 02/29/2024        Will call pt with lab results.          Follow up in about 1 year (around 2/28/2025).

## 2024-05-09 ENCOUNTER — CLINICAL SUPPORT (OUTPATIENT)
Dept: OBSTETRICS AND GYNECOLOGY | Facility: CLINIC | Age: 34
End: 2024-05-09
Payer: COMMERCIAL

## 2024-05-09 VITALS
HEART RATE: 110 BPM | OXYGEN SATURATION: 99 % | WEIGHT: 208 LBS | DIASTOLIC BLOOD PRESSURE: 87 MMHG | HEIGHT: 64 IN | BODY MASS INDEX: 35.51 KG/M2 | RESPIRATION RATE: 17 BRPM | SYSTOLIC BLOOD PRESSURE: 121 MMHG

## 2024-05-09 DIAGNOSIS — Z30.42 SURVEILLANCE OF CONTRACEPTIVE INJECTION: Primary | ICD-10-CM

## 2024-05-09 PROCEDURE — 96372 THER/PROPH/DIAG INJ SC/IM: CPT | Mod: ,,, | Performed by: ADVANCED PRACTICE MIDWIFE

## 2024-05-09 RX ORDER — MEDROXYPROGESTERONE ACETATE 150 MG/ML
150 INJECTION, SUSPENSION INTRAMUSCULAR
Status: COMPLETED | OUTPATIENT
Start: 2024-05-09 | End: 2024-05-09

## 2024-05-09 RX ADMIN — MEDROXYPROGESTERONE ACETATE 150 MG: 150 INJECTION, SUSPENSION INTRAMUSCULAR at 04:05

## 2024-05-09 NOTE — PROGRESS NOTES
Depo Provera 150 mg given IM in left deltoid.  Date last pap: 12/03/2021.  Last Depo-Provera: 2/22/2024.  Side Effects if any: Bleeding x 3 weeks.  Serum HCG indicated? N/A.  Depo-Provera 150 mg IM given by: Milvia Chase LPN.  Next appointment due 7/25/2024-8/8/2024.

## 2024-07-29 ENCOUNTER — OFFICE VISIT (OUTPATIENT)
Dept: OBSTETRICS AND GYNECOLOGY | Facility: CLINIC | Age: 34
End: 2024-07-29
Payer: COMMERCIAL

## 2024-07-29 VITALS
OXYGEN SATURATION: 99 % | RESPIRATION RATE: 17 BRPM | DIASTOLIC BLOOD PRESSURE: 78 MMHG | HEIGHT: 64 IN | WEIGHT: 184.81 LBS | HEART RATE: 86 BPM | BODY MASS INDEX: 31.55 KG/M2 | TEMPERATURE: 98 F | SYSTOLIC BLOOD PRESSURE: 123 MMHG

## 2024-07-29 DIAGNOSIS — Z30.42 SURVEILLANCE OF CONTRACEPTIVE INJECTION: Primary | ICD-10-CM

## 2024-07-29 DIAGNOSIS — E66.9 OBESITY (BMI 30.0-34.9): ICD-10-CM

## 2024-07-29 PROCEDURE — 3008F BODY MASS INDEX DOCD: CPT | Mod: CPTII,,, | Performed by: ADVANCED PRACTICE MIDWIFE

## 2024-07-29 PROCEDURE — 3044F HG A1C LEVEL LT 7.0%: CPT | Mod: CPTII,,, | Performed by: ADVANCED PRACTICE MIDWIFE

## 2024-07-29 PROCEDURE — 3074F SYST BP LT 130 MM HG: CPT | Mod: CPTII,,, | Performed by: ADVANCED PRACTICE MIDWIFE

## 2024-07-29 PROCEDURE — 1159F MED LIST DOCD IN RCRD: CPT | Mod: CPTII,,, | Performed by: ADVANCED PRACTICE MIDWIFE

## 2024-07-29 PROCEDURE — 99213 OFFICE O/P EST LOW 20 MIN: CPT | Mod: 25,,, | Performed by: ADVANCED PRACTICE MIDWIFE

## 2024-07-29 PROCEDURE — 3078F DIAST BP <80 MM HG: CPT | Mod: CPTII,,, | Performed by: ADVANCED PRACTICE MIDWIFE

## 2024-07-29 PROCEDURE — 96372 THER/PROPH/DIAG INJ SC/IM: CPT | Mod: ,,, | Performed by: ADVANCED PRACTICE MIDWIFE

## 2024-07-29 RX ORDER — MEDROXYPROGESTERONE ACETATE 150 MG/ML
150 INJECTION, SUSPENSION INTRAMUSCULAR ONCE
Status: COMPLETED | OUTPATIENT
Start: 2024-07-29 | End: 2024-07-29

## 2024-07-29 RX ORDER — PHENTERMINE HYDROCHLORIDE 37.5 MG/1
37.5 TABLET ORAL EVERY MORNING
COMMUNITY
Start: 2024-07-16

## 2024-07-29 RX ADMIN — MEDROXYPROGESTERONE ACETATE 150 MG: 150 INJECTION, SUSPENSION INTRAMUSCULAR at 09:07

## 2024-07-29 NOTE — PROGRESS NOTES
"  Patient ID:  Taisha Rivera is a 34 y.o. female      Chief Complaint:   Chief Complaint   Patient presents with    Contraception     Depo        HPI:  Taisha Rivera is in for repeat depo injection within dates. Denies ACHES, vag discharge, and abnormal vag bleeding. No issues, problems, or complaints. Desires to continue depo injections. Discussed need to schedule annual exam. Last Pap 12/3/2021 NILM, HPV negative.   LMP: No LMP recorded. Patient has had an injection.  Sexually active: yes, declines STD testing    Past Medical History:   Diagnosis Date    COVID-19     Headache     Hypothyroidism, unspecified     Urinary tract infection without hematuria 2022       History reviewed. No pertinent surgical history.    OB History          3    Para   3    Term                AB        Living             SAB        IAB        Ectopic        Multiple        Live Births                     /78 (BP Location: Left arm)   Pulse 86   Temp 98.3 °F (36.8 °C)   Resp 17   Ht 5' 4" (1.626 m)   Wt 83.8 kg (184 lb 12.8 oz)   SpO2 99%   BMI 31.72 kg/m²   Wt Readings from Last 3 Encounters:   24 83.8 kg (184 lb 12.8 oz)   24 94.3 kg (208 lb)   24 100.2 kg (221 lb)          ROS:  Review of Systems   Constitutional: Negative.    Eyes: Negative.    Respiratory: Negative.     Cardiovascular: Negative.    Gastrointestinal: Negative.    Genitourinary: Negative.    Musculoskeletal: Negative.    Neurological: Negative.    Psychiatric/Behavioral: Negative.            PHYSICAL EXAM:  Physical Exam  Constitutional:       Appearance: Normal appearance. She is obese.   Eyes:      Extraocular Movements: Extraocular movements intact.   Cardiovascular:      Rate and Rhythm: Normal rate.      Pulses: Normal pulses.   Pulmonary:      Effort: Pulmonary effort is normal.   Musculoskeletal:         General: Normal range of motion.      Cervical back: Normal range of motion.   Skin:     " "General: Skin is warm and dry.   Neurological:      Mental Status: She is alert and oriented to person, place, and time.   Psychiatric:         Mood and Affect: Mood normal.         Behavior: Behavior normal.         Thought Content: Thought content normal.         Judgment: Judgment normal.          Assessment:  Surveillance of contraceptive injection  -     medroxyPROGESTERone (DEPO-PROVERA) syringe 150 mg    Obesity (BMI 30.0-34.9)          ICD-10-CM ICD-9-CM    1. Surveillance of contraceptive injection  Z30.42 V25.49 medroxyPROGESTERone (DEPO-PROVERA) syringe 150 mg      2. Obesity (BMI 30.0-34.9)  E66.9 278.00           Plan:  Depo Provera 150 mg IM given, Reviewed "ACHES" of contraceptives and possible S/E  Encouraged Vitamin D and calcium supplements or daily multivitamin  Instructed on condom use during each sexual encounter to decrease STD exposure risk  Discussed need to schedule annual exam    Follow up in about 3 months (around 10/29/2024) for repeat depo injection.                    "

## 2024-08-13 ENCOUNTER — OFFICE VISIT (OUTPATIENT)
Dept: PRIMARY CARE CLINIC | Facility: CLINIC | Age: 34
End: 2024-08-13
Payer: COMMERCIAL

## 2024-08-13 VITALS
SYSTOLIC BLOOD PRESSURE: 118 MMHG | DIASTOLIC BLOOD PRESSURE: 80 MMHG | TEMPERATURE: 100 F | HEIGHT: 64 IN | OXYGEN SATURATION: 98 % | BODY MASS INDEX: 31.41 KG/M2 | WEIGHT: 184 LBS | HEART RATE: 124 BPM

## 2024-08-13 DIAGNOSIS — U07.1 COVID-19: Primary | ICD-10-CM

## 2024-08-13 DIAGNOSIS — R05.9 COUGH, UNSPECIFIED TYPE: ICD-10-CM

## 2024-08-13 LAB
CTP QC/QA: YES
CTP QC/QA: YES
POC MOLECULAR INFLUENZA A AGN: NEGATIVE
POC MOLECULAR INFLUENZA B AGN: NEGATIVE
SARS-COV-2 RDRP RESP QL NAA+PROBE: POSITIVE

## 2024-08-13 PROCEDURE — 87502 INFLUENZA DNA AMP PROBE: CPT | Mod: QW,,, | Performed by: NURSE PRACTITIONER

## 2024-08-13 PROCEDURE — 3079F DIAST BP 80-89 MM HG: CPT | Mod: CPTII,,, | Performed by: NURSE PRACTITIONER

## 2024-08-13 PROCEDURE — 3008F BODY MASS INDEX DOCD: CPT | Mod: CPTII,,, | Performed by: NURSE PRACTITIONER

## 2024-08-13 PROCEDURE — 3074F SYST BP LT 130 MM HG: CPT | Mod: CPTII,,, | Performed by: NURSE PRACTITIONER

## 2024-08-13 PROCEDURE — 87635 SARS-COV-2 COVID-19 AMP PRB: CPT | Mod: QW,,, | Performed by: NURSE PRACTITIONER

## 2024-08-13 PROCEDURE — 1160F RVW MEDS BY RX/DR IN RCRD: CPT | Mod: CPTII,,, | Performed by: NURSE PRACTITIONER

## 2024-08-13 PROCEDURE — 99213 OFFICE O/P EST LOW 20 MIN: CPT | Mod: ,,, | Performed by: NURSE PRACTITIONER

## 2024-08-13 PROCEDURE — 3044F HG A1C LEVEL LT 7.0%: CPT | Mod: CPTII,,, | Performed by: NURSE PRACTITIONER

## 2024-08-13 PROCEDURE — 1159F MED LIST DOCD IN RCRD: CPT | Mod: CPTII,,, | Performed by: NURSE PRACTITIONER

## 2024-08-13 RX ORDER — ERGOCALCIFEROL 1.25 MG/1
50000 CAPSULE ORAL
COMMUNITY
Start: 2024-07-16

## 2024-08-13 RX ORDER — METFORMIN HYDROCHLORIDE 500 MG/1
500 TABLET ORAL
COMMUNITY
Start: 2024-06-18

## 2024-08-13 NOTE — PROGRESS NOTES
Subjective     Patient ID: Taisha Rivera is a 34 y.o. female.    Chief Complaint: Fatigue (Pt Complains of fatigue, chills and coughing.)    Pt presents with upper resp symptoms since Sunday.     Fatigue  Associated symptoms include congestion and fatigue. Pertinent negatives include no abdominal pain, change in bowel habit, chest pain, chills, coughing, fever, headaches, nausea, sore throat or vomiting.     Review of Systems   Constitutional:  Positive for fatigue. Negative for activity change, appetite change, chills and fever.   HENT:  Positive for nasal congestion and sinus pressure/congestion. Negative for ear discharge, nosebleeds, postnasal drip, rhinorrhea, sneezing, sore throat and tinnitus.    Eyes:  Negative for pain, discharge, redness and itching.   Respiratory:  Negative for cough, choking, chest tightness, shortness of breath and wheezing.    Cardiovascular:  Negative for chest pain.   Gastrointestinal:  Negative for abdominal distention, abdominal pain, blood in stool, change in bowel habit, constipation, diarrhea, nausea and vomiting.   Genitourinary:  Negative for decreased urine volume, dysuria, flank pain, frequency, menstrual irregularity and menstrual problem.   Musculoskeletal:  Negative for back pain and gait problem.   Integumentary:  Negative for wound, breast mass and breast discharge.   Allergic/Immunologic: Negative for immunocompromised state.   Neurological:  Negative for dizziness, light-headedness and headaches.   Psychiatric/Behavioral:  Negative for agitation, behavioral problems and hallucinations.    Breast: Negative for mass         Objective     Physical Exam  Vitals and nursing note reviewed.   Constitutional:       Appearance: Normal appearance.   Cardiovascular:      Rate and Rhythm: Normal rate and regular rhythm.      Heart sounds: Normal heart sounds.   Pulmonary:      Effort: Pulmonary effort is normal.      Breath sounds: Normal breath sounds.    Musculoskeletal:         General: Normal range of motion.   Neurological:      Mental Status: She is alert and oriented to person, place, and time.   Psychiatric:         Mood and Affect: Mood normal.         Behavior: Behavior normal.            Assessment and Plan     1. COVID-19    2. Cough, unspecified type  -     POCT COVID-19 Rapid Screening  -     POCT Influenza A/B Molecular        Quarantine until fever free for 24 hours without fever reducers. Notify clinic if symptoms worsen or persist. Drink plenty of fluids.          Follow up if symptoms worsen or fail to improve.

## 2024-08-13 NOTE — LETTER
August 13, 2024      [facility]  [address] [facility] [address]  Phone: [phone]  Fax: [phone]       Patient: Taisha Rivera   YOB: 1990  Date of Visit: 08/13/2024    To Whom It May Concern:    Demar Rivera  was at [facility] on 08/13/2024. The patient may return to work/school on 8/15/2024 with no restrictions. If you have any questions or concerns, or if I can be of further assistance, please do not hesitate to contact me.    Sincerely,    AVA Hernandez

## 2024-08-13 NOTE — LETTER
August 15, 2024      [facility]  [address] [facility] [address]  Phone: [phone]  Fax: [phone]       Patient: Taisha Rivera   YOB: 1990  Date of Visit: 8/13/2024    To Whom It May Concern:    Demar Rivera  was at [facility] on 8/13/2024. The patient may return to work/school on 8/17/2024 with no restrictions. If you have any questions or concerns, or if I can be of further assistance, please do not hesitate to contact me.    Sincerely,    AVA Hernandez

## 2024-08-15 ENCOUNTER — PATIENT MESSAGE (OUTPATIENT)
Dept: PRIMARY CARE CLINIC | Facility: CLINIC | Age: 34
End: 2024-08-15
Payer: COMMERCIAL

## 2024-11-01 ENCOUNTER — OFFICE VISIT (OUTPATIENT)
Dept: OBSTETRICS AND GYNECOLOGY | Facility: CLINIC | Age: 34
End: 2024-11-01
Payer: COMMERCIAL

## 2024-11-01 VITALS
RESPIRATION RATE: 17 BRPM | SYSTOLIC BLOOD PRESSURE: 113 MMHG | OXYGEN SATURATION: 99 % | DIASTOLIC BLOOD PRESSURE: 75 MMHG | WEIGHT: 182.19 LBS | HEART RATE: 87 BPM | HEIGHT: 64 IN | BODY MASS INDEX: 31.1 KG/M2

## 2024-11-01 DIAGNOSIS — Z30.42 SURVEILLANCE OF CONTRACEPTIVE INJECTION: Primary | ICD-10-CM

## 2024-11-01 DIAGNOSIS — E66.811 OBESITY (BMI 30.0-34.9): ICD-10-CM

## 2024-11-01 RX ORDER — MEDROXYPROGESTERONE ACETATE 150 MG/ML
150 INJECTION, SUSPENSION INTRAMUSCULAR ONCE
Status: COMPLETED | OUTPATIENT
Start: 2024-11-01 | End: 2024-11-01

## 2024-11-01 RX ADMIN — MEDROXYPROGESTERONE ACETATE 150 MG: 150 INJECTION, SUSPENSION INTRAMUSCULAR at 02:11

## 2024-12-02 ENCOUNTER — OFFICE VISIT (OUTPATIENT)
Dept: DERMATOLOGY | Facility: CLINIC | Age: 34
End: 2024-12-02
Payer: COMMERCIAL

## 2024-12-02 VITALS — HEIGHT: 64 IN | BODY MASS INDEX: 31.09 KG/M2 | RESPIRATION RATE: 18 BRPM | WEIGHT: 182.13 LBS

## 2024-12-02 DIAGNOSIS — L70.0 ACNE VULGARIS: Primary | ICD-10-CM

## 2024-12-02 DIAGNOSIS — L81.0 POST-INFLAMMATORY HYPERPIGMENTATION: ICD-10-CM

## 2024-12-02 DIAGNOSIS — L21.9 SEBORRHEIC DERMATITIS: ICD-10-CM

## 2024-12-02 PROCEDURE — 3044F HG A1C LEVEL LT 7.0%: CPT | Mod: CPTII,,, | Performed by: DERMATOLOGY

## 2024-12-02 PROCEDURE — 1160F RVW MEDS BY RX/DR IN RCRD: CPT | Mod: CPTII,,, | Performed by: DERMATOLOGY

## 2024-12-02 PROCEDURE — 99204 OFFICE O/P NEW MOD 45 MIN: CPT | Mod: ,,, | Performed by: DERMATOLOGY

## 2024-12-02 PROCEDURE — 1159F MED LIST DOCD IN RCRD: CPT | Mod: CPTII,,, | Performed by: DERMATOLOGY

## 2024-12-02 PROCEDURE — 3008F BODY MASS INDEX DOCD: CPT | Mod: CPTII,,, | Performed by: DERMATOLOGY

## 2024-12-02 RX ORDER — TRIAMCINOLONE ACETONIDE 0.25 MG/G
CREAM TOPICAL
Qty: 80 G | Refills: 2 | Status: SHIPPED | OUTPATIENT
Start: 2024-12-02

## 2024-12-02 RX ORDER — TRETINOIN 0.25 MG/G
CREAM TOPICAL
Qty: 45 G | Refills: 5 | Status: SHIPPED | OUTPATIENT
Start: 2024-12-02

## 2024-12-02 RX ORDER — CLOBETASOL PROPIONATE 0.5 MG/ML
SOLUTION TOPICAL
Qty: 50 ML | Refills: 5 | Status: SHIPPED | OUTPATIENT
Start: 2024-12-02

## 2024-12-02 NOTE — PROGRESS NOTES
[facility]   Lisa Tran MD    Patient Name: Taisha Rivera  Patient YOB: 1990   Date of Service: 12/2/24    CC: Acne    HPI: Taisha Rivera is a 34 y.o. female here today for acne, located on the face, chest, and back.  Acne has been present for 2 months.  Previous treatments include Dove bar soap.  Patient is also concerned today about flaking located on the scalp, currently using Head and Shoulders shampoo.    Past Medical History:   Diagnosis Date    COVID-19     Headache     Hypothyroidism, unspecified     Urinary tract infection without hematuria 02/28/2022     History reviewed. No pertinent surgical history.  Review of patient's allergies indicates:   Allergen Reactions    Augmentin [amoxicillin-pot clavulanate] Swelling       Current Outpatient Medications:     clobetasoL (TEMOVATE) 0.05 % external solution, Apply to scalp  BID tapering with improvement, Disp: 50 mL, Rfl: 5    ergocalciferol (ERGOCALCIFEROL) 50,000 unit Cap, Take 50,000 Units by mouth every 7 days., Disp: , Rfl:     metFORMIN (GLUCOPHAGE) 500 MG tablet, Take 500 mg by mouth., Disp: , Rfl:     phentermine (ADIPEX-P) 37.5 mg tablet, Take 37.5 mg by mouth every morning., Disp: , Rfl:     tretinoin (RETIN-A) 0.025 % cream, Apply pea-sized amount to face every other night advancing to nightly when tolerated, Disp: 45 g, Rfl: 5    triamcinolone acetonide 0.025% (KENALOG) 0.025 % cream, Apply to AA on face BID, tapering with improvement, Disp: 80 g, Rfl: 2    ROS: A focused review of systems was obtained and negative.     Exam: A focused skin exam was performed. All areas examined were normal except as mentioned in the assessment and plan below.  General Appearance of the patient is well developed and well nourished.  Orientation: alert and oriented x 3.  Mood and affect: pleasant.    Assessment:   The primary encounter diagnosis was Acne vulgaris. Diagnoses of Post-inflammatory hyperpigmentation and  Seborrheic dermatitis were also pertinent to this visit.    Plan:   Medications Ordered This Encounter   Medications    clobetasoL (TEMOVATE) 0.05 % external solution     Sig: Apply to scalp  BID tapering with improvement     Dispense:  50 mL     Refill:  5    tretinoin (RETIN-A) 0.025 % cream     Sig: Apply pea-sized amount to face every other night advancing to nightly when tolerated     Dispense:  45 g     Refill:  5    triamcinolone acetonide 0.025% (KENALOG) 0.025 % cream     Sig: Apply to AA on face BID, tapering with improvement     Dispense:  80 g     Refill:  2     Acne Vulgaris (L70.0)  - inflammatory papules and pustules and comedonal papules  Status: Inadequately controlled    Plan: Counseling.  I counseled the regarding the following:  Skin care: I discussed with the patient the importance of using cleansers, moisturizers and cosmetics that are  non-comedogenic.  Expectations: The patient is aware that it may take up to 2-3 months to see a 60-80% improvement of acne.  Contact office if: Acne worsens or fails to improve despite months of treatment; patient develops new scars,  significantly more nodules or cysts.  I recommended the following over the counter treatments: CeraVe or Cetaphil   - Will start tretinoin 0.025     Seborrheic Dermatitis  - Pink/orange scaly plaques located on the scalp, nasolabial folds, and eyebrows    Status: Inadequately controlled    Plan: Counseling.  I counseled the patient regarding the following:  Skin care: Emollients, shampoos with tar, selenium or zinc pyrithione can improve seborrheic dermatitis.  Expectations: Seborrheic Dermatitis is chronic in nature with periods of remissions and flares. Flares can be  triggered by stress.  Contact office if: Seborrheic dermatitis worsens, or fails to improve despite several months of treatment.    Plan: Prescription   - Continue to use Head and Shoulder shampoo    Post-Inflammatory Hyperpigmentation  - ill-defined hyperpigmented  patches    Plan: Counseling  I counseled the patient regarding the following:  Skin care: Recommend minimizing sun exposure, wearing sunscreen and protective clothing.  Expectations: Post Inflammatory pigmentary change is lighter or darker discoloration than surrounding skin resulting from trauma or rashes. Areas tend to normalize over time, but can take months to years.  Contact office if: PIH worsens, or spread to other parts of the body.    I recommended the following:  Sunscreen        Follow up in about 3 months (around 3/2/2025) for Acne .    Lisa Tran MD

## 2025-01-27 ENCOUNTER — OFFICE VISIT (OUTPATIENT)
Facility: CLINIC | Age: 35
End: 2025-01-27
Payer: COMMERCIAL

## 2025-01-27 VITALS
BODY MASS INDEX: 30.32 KG/M2 | SYSTOLIC BLOOD PRESSURE: 120 MMHG | HEIGHT: 64 IN | OXYGEN SATURATION: 99 % | DIASTOLIC BLOOD PRESSURE: 85 MMHG | WEIGHT: 177.63 LBS | HEART RATE: 88 BPM | RESPIRATION RATE: 17 BRPM

## 2025-01-27 DIAGNOSIS — Z30.42 SURVEILLANCE OF CONTRACEPTIVE INJECTION: Primary | ICD-10-CM

## 2025-01-27 DIAGNOSIS — E66.811 OBESITY (BMI 30.0-34.9): ICD-10-CM

## 2025-01-27 DIAGNOSIS — N92.1 BREAKTHROUGH BLEEDING ON DEPO-PROVERA: ICD-10-CM

## 2025-01-27 PROCEDURE — 3074F SYST BP LT 130 MM HG: CPT | Mod: CPTII,,, | Performed by: ADVANCED PRACTICE MIDWIFE

## 2025-01-27 PROCEDURE — 3008F BODY MASS INDEX DOCD: CPT | Mod: CPTII,,, | Performed by: ADVANCED PRACTICE MIDWIFE

## 2025-01-27 PROCEDURE — 96372 THER/PROPH/DIAG INJ SC/IM: CPT | Mod: ,,, | Performed by: ADVANCED PRACTICE MIDWIFE

## 2025-01-27 PROCEDURE — 99213 OFFICE O/P EST LOW 20 MIN: CPT | Mod: 25,,, | Performed by: ADVANCED PRACTICE MIDWIFE

## 2025-01-27 PROCEDURE — 1159F MED LIST DOCD IN RCRD: CPT | Mod: CPTII,,, | Performed by: ADVANCED PRACTICE MIDWIFE

## 2025-01-27 PROCEDURE — 3079F DIAST BP 80-89 MM HG: CPT | Mod: CPTII,,, | Performed by: ADVANCED PRACTICE MIDWIFE

## 2025-01-27 RX ORDER — IBUPROFEN 800 MG/1
800 TABLET ORAL EVERY 8 HOURS PRN
Qty: 60 TABLET | Refills: 0 | Status: SHIPPED | OUTPATIENT
Start: 2025-01-27

## 2025-01-27 RX ORDER — MEDROXYPROGESTERONE ACETATE 150 MG/ML
150 INJECTION, SUSPENSION INTRAMUSCULAR ONCE
Status: COMPLETED | OUTPATIENT
Start: 2025-01-27 | End: 2025-01-27

## 2025-01-27 RX ORDER — MEDROXYPROGESTERONE ACETATE 10 MG/1
10 TABLET ORAL DAILY
Qty: 10 TABLET | Refills: 0 | Status: SHIPPED | OUTPATIENT
Start: 2025-01-27 | End: 2026-01-27

## 2025-01-27 RX ADMIN — MEDROXYPROGESTERONE ACETATE 150 MG: 150 INJECTION, SUSPENSION INTRAMUSCULAR at 09:01

## 2025-01-27 NOTE — PROGRESS NOTES
"  Patient ID:  Taisha Rivera is a 34 y.o. female      Chief Complaint:   Chief Complaint   Patient presents with    Contraception     Depo    Cramping     Has been having cramps and spotting for the last month.. started bleeding heavy on         HPI:  Taisha Rivera is in for repeat depo injection within dates. Denies ACHES and vaginal discharge. Has had issues with BTB for last 3 weeks.   LMP: Patient's last menstrual period was 2025.  Sexually active: yes    Past Medical History:   Diagnosis Date    COVID-19     Headache     Hypothyroidism, unspecified     Urinary tract infection without hematuria 2022       History reviewed. No pertinent surgical history.    OB History          3    Para   3    Term                AB        Living             SAB        IAB        Ectopic        Multiple        Live Births                     /85 (BP Location: Left arm, Patient Position: Sitting)   Pulse 88   Resp 17   Ht 5' 4" (1.626 m)   Wt 80.6 kg (177 lb 9.6 oz)   LMP 2025   SpO2 99%   BMI 30.48 kg/m²   Wt Readings from Last 3 Encounters:   25 80.6 kg (177 lb 9.6 oz)   24 82.6 kg (182 lb 1.6 oz)   24 82.6 kg (182 lb 3.2 oz)          ROS:  Review of Systems   Constitutional: Negative.    Eyes: Negative.    Respiratory: Negative.     Cardiovascular: Negative.    Gastrointestinal: Negative.    Genitourinary:  Positive for menstrual problem.   Musculoskeletal: Negative.    Neurological: Negative.    Psychiatric/Behavioral: Negative.            PHYSICAL EXAM:  Physical Exam  Constitutional:       Appearance: Normal appearance. She is obese.   Eyes:      Extraocular Movements: Extraocular movements intact.   Cardiovascular:      Rate and Rhythm: Normal rate.      Pulses: Normal pulses.   Pulmonary:      Effort: Pulmonary effort is normal.   Abdominal:      Palpations: Abdomen is soft.   Musculoskeletal:         General: Normal range of " "motion.      Cervical back: Normal range of motion.   Skin:     General: Skin is warm and dry.   Neurological:      Mental Status: She is alert and oriented to person, place, and time.   Psychiatric:         Mood and Affect: Mood normal.         Behavior: Behavior normal.         Thought Content: Thought content normal.         Judgment: Judgment normal.          Assessment:  Surveillance of contraceptive injection  -     medroxyPROGESTERone (DEPO-PROVERA) syringe 150 mg    Breakthrough bleeding on Depo-Provera  -     medroxyPROGESTERone (PROVERA) 10 MG tablet; Take 1 tablet (10 mg total) by mouth once daily. Or take 1 tablet two times a day for 5 days to manage breakthrough bleeding.  Dispense: 10 tablet; Refill: 0  -     ibuprofen (ADVIL,MOTRIN) 800 MG tablet; Take 1 tablet (800 mg total) by mouth every 8 (eight) hours as needed for Pain.  Dispense: 60 tablet; Refill: 0    Obesity (BMI 30.0-34.9)          ICD-10-CM ICD-9-CM    1. Surveillance of contraceptive injection  Z30.42 V25.49 medroxyPROGESTERone (DEPO-PROVERA) syringe 150 mg      2. Breakthrough bleeding on Depo-Provera  N92.1 626.6 medroxyPROGESTERone (PROVERA) 10 MG tablet      ibuprofen (ADVIL,MOTRIN) 800 MG tablet      3. Obesity (BMI 30.0-34.9)  E66.811 278.00           Plan:  Depo Provera 150 mg IM given, Reviewed "ACHES" of contraceptives and possible S/E  Encouraged Vitamin D and calcium supplements or daily multivitamin  Discussed BTB as common s/e with depo injections  Provera dosing sent for BTB management with instructions to take if bleeding continue after repeat injection     Follow up in about 3 months (around 4/27/2025) for repeat depo injection.                    "

## 2025-02-27 ENCOUNTER — TELEPHONE (OUTPATIENT)
Dept: DERMATOLOGY | Facility: CLINIC | Age: 35
End: 2025-02-27
Payer: COMMERCIAL

## 2025-03-11 ENCOUNTER — OFFICE VISIT (OUTPATIENT)
Dept: FAMILY MEDICINE | Facility: CLINIC | Age: 35
End: 2025-03-11
Payer: COMMERCIAL

## 2025-03-11 VITALS
TEMPERATURE: 98 F | DIASTOLIC BLOOD PRESSURE: 80 MMHG | HEIGHT: 64 IN | OXYGEN SATURATION: 100 % | HEART RATE: 104 BPM | WEIGHT: 184.19 LBS | SYSTOLIC BLOOD PRESSURE: 131 MMHG | BODY MASS INDEX: 31.45 KG/M2

## 2025-03-11 DIAGNOSIS — R52 BODY ACHES: ICD-10-CM

## 2025-03-11 DIAGNOSIS — B34.9 VIRAL ILLNESS: Primary | ICD-10-CM

## 2025-03-11 LAB
CTP QC/QA: YES
CTP QC/QA: YES
POC MOLECULAR INFLUENZA A AGN: NEGATIVE
POC MOLECULAR INFLUENZA B AGN: NEGATIVE
SARS-COV-2 RDRP RESP QL NAA+PROBE: NEGATIVE

## 2025-03-11 PROCEDURE — 3008F BODY MASS INDEX DOCD: CPT | Mod: CPTII,,, | Performed by: NURSE PRACTITIONER

## 2025-03-11 PROCEDURE — 1160F RVW MEDS BY RX/DR IN RCRD: CPT | Mod: CPTII,,, | Performed by: NURSE PRACTITIONER

## 2025-03-11 PROCEDURE — 3075F SYST BP GE 130 - 139MM HG: CPT | Mod: CPTII,,, | Performed by: NURSE PRACTITIONER

## 2025-03-11 PROCEDURE — 3079F DIAST BP 80-89 MM HG: CPT | Mod: CPTII,,, | Performed by: NURSE PRACTITIONER

## 2025-03-11 PROCEDURE — 87502 INFLUENZA DNA AMP PROBE: CPT | Mod: QW,,, | Performed by: NURSE PRACTITIONER

## 2025-03-11 PROCEDURE — 99213 OFFICE O/P EST LOW 20 MIN: CPT | Mod: ,,, | Performed by: NURSE PRACTITIONER

## 2025-03-11 PROCEDURE — 87635 SARS-COV-2 COVID-19 AMP PRB: CPT | Mod: QW,,, | Performed by: NURSE PRACTITIONER

## 2025-03-11 PROCEDURE — 1159F MED LIST DOCD IN RCRD: CPT | Mod: CPTII,,, | Performed by: NURSE PRACTITIONER

## 2025-03-11 NOTE — LETTER
March 11, 2025      [facility]  [address] [facility] [address]  Phone: [phone]  Fax: [phone]       Patient: Taisha Rivera   YOB: 1990  Date of Visit: 03/11/2025    To Whom It May Concern:    Demar Rivera  was at [facility] on 03/11/2025. The patient may return to work/school on 3/11/2025 with no restrictions. If you have any questions or concerns, or if I can be of further assistance, please do not hesitate to contact me.    Sincerely,    AVA Hernandez

## 2025-03-11 NOTE — PROGRESS NOTES
Subjective     Patient ID: Taisha Rivera is a 35 y.o. female.    Chief Complaint: Generalized Body Aches (Fatigue x 2 days)    Pt presents with fatigue and body aches x 2 days.       Review of Systems   Constitutional:  Positive for fatigue. Negative for activity change, appetite change, chills and fever.   HENT:  Negative for nasal congestion, ear discharge, nosebleeds, postnasal drip, rhinorrhea, sinus pressure/congestion, sneezing, sore throat and tinnitus.    Eyes:  Negative for pain, discharge, redness and itching.   Respiratory:  Negative for cough, choking, chest tightness, shortness of breath and wheezing.    Cardiovascular:  Negative for chest pain.   Gastrointestinal:  Negative for abdominal distention, abdominal pain, blood in stool, change in bowel habit, constipation, diarrhea, nausea and vomiting.   Genitourinary:  Negative for decreased urine volume, dysuria, flank pain, frequency, menstrual irregularity and menstrual problem.   Musculoskeletal:  Positive for arthralgias. Negative for back pain and gait problem.   Integumentary:  Negative for wound, breast mass and breast discharge.   Allergic/Immunologic: Negative for immunocompromised state.   Neurological:  Negative for dizziness, light-headedness and headaches.   Psychiatric/Behavioral:  Negative for agitation, behavioral problems and hallucinations.    Breast: Negative for mass         Objective     Physical Exam  Vitals and nursing note reviewed.   Constitutional:       Appearance: Normal appearance.   Cardiovascular:      Rate and Rhythm: Normal rate and regular rhythm.      Heart sounds: Normal heart sounds.   Pulmonary:      Effort: Pulmonary effort is normal.      Breath sounds: Normal breath sounds.   Musculoskeletal:         General: Normal range of motion.   Neurological:      Mental Status: She is alert and oriented to person, place, and time.   Psychiatric:         Mood and Affect: Mood normal.         Behavior: Behavior  normal.            Assessment and Plan     1. Viral illness    2. Body aches  -     POCT COVID-19 Rapid Screening  -     POCT Influenza A/B Molecular        Tylenol if need for body aches.         Follow up if symptoms worsen or fail to improve.

## 2025-04-16 ENCOUNTER — PATIENT MESSAGE (OUTPATIENT)
Dept: FAMILY MEDICINE | Facility: CLINIC | Age: 35
End: 2025-04-16
Payer: COMMERCIAL

## 2025-04-16 ENCOUNTER — OFFICE VISIT (OUTPATIENT)
Dept: FAMILY MEDICINE | Facility: CLINIC | Age: 35
End: 2025-04-16
Payer: COMMERCIAL

## 2025-04-16 VITALS
HEART RATE: 107 BPM | SYSTOLIC BLOOD PRESSURE: 140 MMHG | WEIGHT: 180.63 LBS | BODY MASS INDEX: 30.84 KG/M2 | OXYGEN SATURATION: 99 % | RESPIRATION RATE: 16 BRPM | DIASTOLIC BLOOD PRESSURE: 87 MMHG | HEIGHT: 64 IN

## 2025-04-16 DIAGNOSIS — R10.30 LOWER ABDOMINAL PAIN: Primary | ICD-10-CM

## 2025-04-16 LAB
ALBUMIN SERPL BCP-MCNC: 4.6 G/DL (ref 3.5–5)
ALBUMIN/GLOB SERPL: 1.4 {RATIO}
ALP SERPL-CCNC: 67 U/L (ref 40–150)
ALT SERPL W P-5'-P-CCNC: 24 U/L
ANION GAP SERPL CALCULATED.3IONS-SCNC: 13 MMOL/L (ref 7–16)
AST SERPL W P-5'-P-CCNC: 18 U/L (ref 11–45)
B-HCG UR QL: NEGATIVE
BASOPHILS # BLD AUTO: 0.06 K/UL (ref 0–0.2)
BASOPHILS NFR BLD AUTO: 0.9 % (ref 0–1)
BILIRUB SERPL-MCNC: 0.4 MG/DL
BILIRUB UR QL STRIP: NEGATIVE
BUN SERPL-MCNC: 9 MG/DL (ref 7–19)
BUN/CREAT SERPL: 10 (ref 6–20)
CALCIUM SERPL-MCNC: 9.8 MG/DL (ref 8.4–10.2)
CHLORIDE SERPL-SCNC: 105 MMOL/L (ref 98–107)
CLARITY UR: CLEAR
CO2 SERPL-SCNC: 27 MMOL/L (ref 22–29)
COLOR UR: COLORLESS
CREAT SERPL-MCNC: 0.87 MG/DL (ref 0.55–1.02)
CTP QC/QA: YES
DIFFERENTIAL METHOD BLD: ABNORMAL
EGFR (NO RACE VARIABLE) (RUSH/TITUS): 90 ML/MIN/1.73M2
EOSINOPHIL # BLD AUTO: 0.41 K/UL (ref 0–0.5)
EOSINOPHIL NFR BLD AUTO: 6.1 % (ref 1–4)
ERYTHROCYTE [DISTWIDTH] IN BLOOD BY AUTOMATED COUNT: 12 % (ref 11.5–14.5)
GLOBULIN SER-MCNC: 3.2 G/DL (ref 2–4)
GLUCOSE SERPL-MCNC: 73 MG/DL (ref 74–100)
GLUCOSE UR STRIP-MCNC: NORMAL MG/DL
HCT VFR BLD AUTO: 41.5 % (ref 38–47)
HGB BLD-MCNC: 13.6 G/DL (ref 12–16)
IMM GRANULOCYTES # BLD AUTO: 0.02 K/UL (ref 0–0.04)
IMM GRANULOCYTES NFR BLD: 0.3 % (ref 0–0.4)
KETONES UR STRIP-SCNC: NEGATIVE MG/DL
LEUKOCYTE ESTERASE UR QL STRIP: NEGATIVE
LYMPHOCYTES # BLD AUTO: 3.19 K/UL (ref 1–4.8)
LYMPHOCYTES NFR BLD AUTO: 47.8 % (ref 27–41)
MCH RBC QN AUTO: 28.8 PG (ref 27–31)
MCHC RBC AUTO-ENTMCNC: 32.8 G/DL (ref 32–36)
MCV RBC AUTO: 87.7 FL (ref 80–96)
MONOCYTES # BLD AUTO: 0.51 K/UL (ref 0–0.8)
MONOCYTES NFR BLD AUTO: 7.6 % (ref 2–6)
MPC BLD CALC-MCNC: 8.8 FL (ref 9.4–12.4)
NEUTROPHILS # BLD AUTO: 2.49 K/UL (ref 1.8–7.7)
NEUTROPHILS NFR BLD AUTO: 37.3 % (ref 53–65)
NITRITE UR QL STRIP: NEGATIVE
NRBC # BLD AUTO: 0 X10E3/UL
NRBC, AUTO (.00): 0 %
PH UR STRIP: 8 PH UNITS
PLATELET # BLD AUTO: 311 K/UL (ref 150–400)
POTASSIUM SERPL-SCNC: 4.2 MMOL/L (ref 3.5–5.1)
PROT SERPL-MCNC: 7.8 G/DL (ref 6.4–8.3)
PROT UR QL STRIP: NEGATIVE
RBC # BLD AUTO: 4.73 M/UL (ref 4.2–5.4)
RBC # UR STRIP: NEGATIVE /UL
SODIUM SERPL-SCNC: 141 MMOL/L (ref 136–145)
SP GR UR STRIP: 1.01
UROBILINOGEN UR STRIP-ACNC: NORMAL MG/DL
WBC # BLD AUTO: 6.68 K/UL (ref 4.5–11)

## 2025-04-16 PROCEDURE — 3008F BODY MASS INDEX DOCD: CPT | Mod: CPTII,,, | Performed by: NURSE PRACTITIONER

## 2025-04-16 PROCEDURE — 3077F SYST BP >= 140 MM HG: CPT | Mod: CPTII,,, | Performed by: NURSE PRACTITIONER

## 2025-04-16 PROCEDURE — 3079F DIAST BP 80-89 MM HG: CPT | Mod: CPTII,,, | Performed by: NURSE PRACTITIONER

## 2025-04-16 PROCEDURE — 80053 COMPREHEN METABOLIC PANEL: CPT | Mod: ,,, | Performed by: CLINICAL MEDICAL LABORATORY

## 2025-04-16 PROCEDURE — 81025 URINE PREGNANCY TEST: CPT | Mod: QW,,, | Performed by: NURSE PRACTITIONER

## 2025-04-16 PROCEDURE — 85025 COMPLETE CBC W/AUTO DIFF WBC: CPT | Mod: ,,, | Performed by: CLINICAL MEDICAL LABORATORY

## 2025-04-16 PROCEDURE — 81003 URINALYSIS AUTO W/O SCOPE: CPT | Mod: QW,,, | Performed by: CLINICAL MEDICAL LABORATORY

## 2025-04-16 PROCEDURE — 99213 OFFICE O/P EST LOW 20 MIN: CPT | Mod: ,,, | Performed by: NURSE PRACTITIONER

## 2025-04-16 PROCEDURE — 1159F MED LIST DOCD IN RCRD: CPT | Mod: CPTII,,, | Performed by: NURSE PRACTITIONER

## 2025-04-16 NOTE — PROGRESS NOTES
Subjective:       Patient ID: Taisha Rivera is a 35 y.o. female.    Chief Complaint: Abdominal Pain, Back Pain, and Health Maintenance (TETANUS VACCINE Pt Declined /COVID-19 Vaccine(1 - 2024-25 season) Pt Declined /)    Patient presents to clinic with abdominal pain radiating to lower back x3 days. Reports intermittent nausea. Reports history of constipation. Last BM 4/12/25. Denies use of laxatives or stool softeners. Denies urinary changes or pain. Denies blood in stool or urine. NO bloating or distention. NO fever,V/D    Active Problem List with Overview Notes    Diagnosis Date Noted    Lower abdominal pain 04/17/2025    BMI 36.0-36.9,adult 05/23/2022    Cephalalgia 01/31/2022    Vitamin B12 deficiency 01/31/2022    Vitamin D deficiency 01/31/2022    Hypothyroidism 01/31/2022        Review of Systems   Constitutional:  Negative for fatigue and fever.   HENT:  Negative for congestion, hearing loss, postnasal drip, rhinorrhea, sore throat, tinnitus and voice change.    Respiratory:  Negative for apnea, cough, choking, chest tightness and shortness of breath.    Cardiovascular:  Negative for chest pain, palpitations and leg swelling.   Gastrointestinal:  Positive for abdominal pain and constipation. Negative for diarrhea and nausea.   Genitourinary:  Negative for difficulty urinating.   Musculoskeletal:  Positive for back pain.   Neurological:  Negative for dizziness, syncope, weakness and headaches.   Psychiatric/Behavioral:  Negative for sleep disturbance.         A1C:  Recent Labs   Lab 06/27/23  1358 02/29/24  1119   Hemoglobin A1C 5.8 5.7     CBC:  Recent Labs   Lab 06/27/23  0938 02/29/24  1119 04/16/25  1446   WBC 7.21 8.07 6.68   RBC 4.37 4.60 4.73   Hemoglobin 12.0 12.7 13.6   Hematocrit 36.4 L 37.3 L 41.5   Platelet Count 309 296 311   MCV 83.3 81.1 87.7   MCH 27.5 27.6 28.8   MCHC 33.0 34.0 32.8     CMP:  Recent Labs   Lab 06/27/23  0938 02/29/24  1119 04/16/25  1446   Glucose 108 H 101 73 L    Calcium 9.7 9.9 9.8   Albumin 4.0 3.8 4.6   Total Protein 7.8 7.7 7.8   Sodium 141 141 141   Potassium 4.0 4.1 4.2   CO2 28 27 27   Chloride 109 H 109 H 105   BUN 11 9 9   Creatinine 1.04 H 0.96 0.87   Alk Phos 76 88 67   ALT 52 47 24   AST 29 23 18   Bilirubin, Total 0.5 0.3 0.4     LIPIDS:  Recent Labs   Lab 02/29/24  1119   TSH 1.530   HDL Cholesterol 49   Cholesterol 199   Triglycerides 62   LDL Calculated 138   Cholesterol/HDL Ratio (Risk Factor) 4.1   Non-     TSH:  Recent Labs   Lab 06/27/23  0938 02/29/24  1119   TSH 2.220 1.530        Objective:      Vitals:    04/16/25 1348   BP: (!) 140/87   Pulse: 107   Resp: 16      Physical Exam  Constitutional:       Appearance: Normal appearance. She is well-developed and normal weight.   HENT:      Head: Normocephalic.      Right Ear: External ear normal.      Left Ear: External ear normal.      Nose: Nose normal.      Mouth/Throat:      Lips: Pink.      Mouth: Mucous membranes are moist.      Pharynx: Oropharynx is clear.   Eyes:      General: Lids are normal.      Pupils: Pupils are equal, round, and reactive to light.   Cardiovascular:      Rate and Rhythm: Normal rate and regular rhythm.      Pulses: Normal pulses.      Heart sounds: Normal heart sounds.   Pulmonary:      Effort: Pulmonary effort is normal.      Breath sounds: Normal breath sounds.   Abdominal:      General: Bowel sounds are decreased.      Palpations: Abdomen is soft.      Tenderness: There is abdominal tenderness in the right lower quadrant and left lower quadrant.   Musculoskeletal:         General: Normal range of motion.      Cervical back: Normal range of motion.   Skin:     General: Skin is warm and dry.   Neurological:      Mental Status: She is alert and oriented to person, place, and time.   Psychiatric:         Attention and Perception: Attention normal.         Mood and Affect: Mood normal.         Speech: Speech normal.         Behavior: Behavior normal. Behavior is  cooperative.            Assessment:       1. Lower abdominal pain        Plan:   KUB- constipation  Increase liquids, mobility.  Miralax until consistent bowel movements then daily stool softener.   Follow up with PCP if symptoms not relieved.  GO to ED with fever, chills, NVD  Problem List Items Addressed This Visit          GI    Lower abdominal pain - Primary    Relevant Orders    Comprehensive Metabolic Panel (Completed)    CBC Auto Differential (Completed)    POCT urine pregnancy (Completed)    Urinalysis, Reflex to Urine Culture (Completed)    X-Ray KUB (Completed)       Health Maintenance:  Health Maintenance Topics with due status: Not Due       Topic Last Completion Date    Cervical Cancer Screening 12/03/2021    RSV Vaccine (Age 60+ and Pregnant patients) Not Due           Shivani Ndiaye   [facility]   4/16/25

## 2025-04-16 NOTE — LETTER
April 16 , 2025      [facility]  [address] [facility] [address]  Phone: [phone]  Fax: [phone]       Patient: Taisha Rivera   YOB: 1990  Date of Visit: 04/16/2025    To Whom It May Concern:    Demar Rivera  was at [facility] on 04/16/2025. The patient may return to work/school on 04/17/2025 with no restrictions. If you have any questions or concerns, or if I can be of further assistance, please do not hesitate to contact me.    Sincerely,    Christin Moreland MA

## 2025-04-17 ENCOUNTER — RESULTS FOLLOW-UP (OUTPATIENT)
Dept: FAMILY MEDICINE | Facility: CLINIC | Age: 35
End: 2025-04-17

## 2025-04-17 PROBLEM — R10.30 LOWER ABDOMINAL PAIN: Status: ACTIVE | Noted: 2025-04-17

## 2025-04-17 NOTE — PROGRESS NOTES
Constipation noted on exam. Miralax as discussed in clinic. Follow up with PCP if symptoms persist.

## 2025-04-21 ENCOUNTER — HOSPITAL ENCOUNTER (EMERGENCY)
Facility: HOSPITAL | Age: 35
Discharge: HOME | End: 2025-04-21
Attending: EMERGENCY MEDICINE
Payer: COMMERCIAL

## 2025-04-21 VITALS
HEIGHT: 64 IN | TEMPERATURE: 99 F | OXYGEN SATURATION: 99 % | HEART RATE: 107 BPM | BODY MASS INDEX: 30.73 KG/M2 | WEIGHT: 180 LBS | DIASTOLIC BLOOD PRESSURE: 78 MMHG | SYSTOLIC BLOOD PRESSURE: 121 MMHG | RESPIRATION RATE: 18 BRPM

## 2025-04-21 DIAGNOSIS — J06.9 VIRAL URI WITH COUGH: Primary | ICD-10-CM

## 2025-04-21 LAB
GROUP A STREP MOLECULAR (OHS): NEGATIVE
INFLUENZA A MOLECULAR (OHS): NEGATIVE
INFLUENZA B MOLECULAR (OHS): NEGATIVE
SARS-COV-2 RDRP RESP QL NAA+PROBE: NEGATIVE

## 2025-04-21 PROCEDURE — 63600175 PHARM REV CODE 636 W HCPCS: Mod: JZ,TB | Performed by: EMERGENCY MEDICINE

## 2025-04-21 PROCEDURE — 99284 EMERGENCY DEPT VISIT MOD MDM: CPT | Mod: 25

## 2025-04-21 PROCEDURE — 87502 INFLUENZA DNA AMP PROBE: CPT | Performed by: EMERGENCY MEDICINE

## 2025-04-21 PROCEDURE — 87635 SARS-COV-2 COVID-19 AMP PRB: CPT | Performed by: EMERGENCY MEDICINE

## 2025-04-21 PROCEDURE — 87651 STREP A DNA AMP PROBE: CPT | Performed by: EMERGENCY MEDICINE

## 2025-04-21 PROCEDURE — 96372 THER/PROPH/DIAG INJ SC/IM: CPT | Performed by: EMERGENCY MEDICINE

## 2025-04-21 RX ORDER — KETOROLAC TROMETHAMINE 30 MG/ML
60 INJECTION, SOLUTION INTRAMUSCULAR; INTRAVENOUS
Status: COMPLETED | OUTPATIENT
Start: 2025-04-21 | End: 2025-04-21

## 2025-04-21 RX ADMIN — KETOROLAC TROMETHAMINE 60 MG: 30 INJECTION, SOLUTION INTRAMUSCULAR at 01:04

## 2025-04-21 NOTE — ED PROVIDER NOTES
Encounter Date: 4/21/2025       History     Chief Complaint   Patient presents with    Sore Throat     C/O SORE THROAT, HA, COUGH FOR A FEW DAYS     Patient complains of a 2 day history of cough congestion sore throat.  No associated fever.  No productive cough.  No shortness of breath.  No dysuria.  No vomiting or diarrhea      Review of patient's allergies indicates:   Allergen Reactions    Augmentin [amoxicillin-pot clavulanate] Swelling     Past Medical History:   Diagnosis Date    Anemia     COVID-19     Headache     Hypothyroidism, unspecified     Urinary tract infection without hematuria 02/28/2022     History reviewed. No pertinent surgical history.  Family History   Problem Relation Name Age of Onset    Rheum arthritis Maternal Grandmother Ofelia     Diabetes Maternal Grandmother Ofelia     Asthma Brother Lavaris     Cancer Maternal Aunt Donnell     Cervical cancer Maternal Aunt Luma     Lupus Neg Hx       Social History[1]  Review of Systems   Constitutional:  Negative for fever.   HENT:  Positive for congestion and sore throat.    Respiratory:  Positive for cough. Negative for shortness of breath.    Cardiovascular:  Negative for chest pain.   Gastrointestinal:  Negative for nausea.   Genitourinary:  Negative for dysuria.   Musculoskeletal:  Negative for back pain.   Skin:  Negative for rash.   Neurological:  Negative for weakness.   Hematological:  Does not bruise/bleed easily.       Physical Exam     Initial Vitals   BP Pulse Resp Temp SpO2   04/21/25 0036 04/21/25 0036 04/21/25 0035 04/21/25 0036 04/21/25 0036   121/78 107 18 99.1 °F (37.3 °C) 99 %      MAP       --                Physical Exam    Nursing note and vitals reviewed.  Constitutional: She appears well-developed and well-nourished.   HENT:   Head: Normocephalic and atraumatic. Mouth/Throat: No oropharyngeal exudate.   Eyes: EOM are normal. Pupils are equal, round, and reactive to light.   Neck: Neck supple. No thyromegaly present.   Normal  range of motion.  Cardiovascular:  Normal rate, regular rhythm, normal heart sounds and intact distal pulses.           No murmur heard.  Pulmonary/Chest: Breath sounds normal. No respiratory distress. She has no wheezes.   Abdominal: Abdomen is soft. Bowel sounds are normal. She exhibits no distension. There is no abdominal tenderness.   Musculoskeletal:         General: No tenderness or edema. Normal range of motion.      Cervical back: Normal range of motion and neck supple.     Lymphadenopathy:     She has no cervical adenopathy.   Neurological: She is alert and oriented to person, place, and time. She has normal strength. No cranial nerve deficit or sensory deficit.   Skin: Skin is warm and dry. No rash noted.   Psychiatric: She has a normal mood and affect.         Medical Screening Exam   See Full Note    ED Course   Procedures  Labs Reviewed   INFLUENZA A & B BY MOLECULAR - Normal       Result Value    INFLUENZA A MOLECULAR Negative      INFLUENZA B MOLECULAR  Negative     STREP A BY MOLECULAR METHOD - Normal    Group A Strep Molecular Negative     SARS-COV-2 RNA AMPLIFICATION, QUAL - Normal    SARS COV-2 Molecular Negative      Narrative:     Negative SARS-CoV results should not be used as the sole basis for treatment or patient management decisions; negative results should be considered in the context of a patient's recent exposures, history and the presene of clinical signs and symptoms consistent with COVID-19.  Negative results should be treated as presumptive and confirmed by molecular assay, if necessary for patient management.          Imaging Results    None          Medications   ketorolac injection 60 mg (60 mg Intramuscular Given 4/21/25 0141)     Medical Decision Making  Amount and/or Complexity of Data Reviewed  Labs: ordered.    Risk  Prescription drug management.                                      Clinical Impression:   Final diagnoses:  [J06.9] Viral URI with cough (Primary)        ED  Disposition Condition    Discharge Good          ED Prescriptions    None       Follow-up Information    None            Cody Nguyen MD  04/21/25 0157         [1]   Social History  Tobacco Use    Smoking status: Never    Smokeless tobacco: Never   Substance Use Topics    Alcohol use: Never    Drug use: Never        Cody Nguyen MD  04/21/25 0158

## 2025-04-21 NOTE — Clinical Note
"Taisha Yeh" Nicole was seen and treated in our emergency department on 4/21/2025.  She may return to work on 04/23/2025.       If you have any questions or concerns, please don't hesitate to call.      Cody Nguyen MD"

## 2025-04-21 NOTE — DISCHARGE INSTRUCTIONS
Flu COVID and strep test are negative    Use ibuprofen and Tylenol as needed    Get plenty of rest    Can return to work Tuesday if feeling better.  Will write work note to return Wednesday as needed

## 2025-04-23 ENCOUNTER — OFFICE VISIT (OUTPATIENT)
Dept: FAMILY MEDICINE | Facility: CLINIC | Age: 35
End: 2025-04-23
Payer: COMMERCIAL

## 2025-04-23 VITALS
SYSTOLIC BLOOD PRESSURE: 128 MMHG | HEIGHT: 64 IN | OXYGEN SATURATION: 100 % | WEIGHT: 186 LBS | DIASTOLIC BLOOD PRESSURE: 80 MMHG | TEMPERATURE: 98 F | BODY MASS INDEX: 31.76 KG/M2 | HEART RATE: 98 BPM

## 2025-04-23 DIAGNOSIS — B34.9 VIRAL ILLNESS: Primary | ICD-10-CM

## 2025-04-23 PROCEDURE — 3079F DIAST BP 80-89 MM HG: CPT | Mod: CPTII,,, | Performed by: NURSE PRACTITIONER

## 2025-04-23 PROCEDURE — 1160F RVW MEDS BY RX/DR IN RCRD: CPT | Mod: CPTII,,, | Performed by: NURSE PRACTITIONER

## 2025-04-23 PROCEDURE — 1159F MED LIST DOCD IN RCRD: CPT | Mod: CPTII,,, | Performed by: NURSE PRACTITIONER

## 2025-04-23 PROCEDURE — 3008F BODY MASS INDEX DOCD: CPT | Mod: CPTII,,, | Performed by: NURSE PRACTITIONER

## 2025-04-23 PROCEDURE — 3074F SYST BP LT 130 MM HG: CPT | Mod: CPTII,,, | Performed by: NURSE PRACTITIONER

## 2025-04-23 PROCEDURE — 99213 OFFICE O/P EST LOW 20 MIN: CPT | Mod: ,,, | Performed by: NURSE PRACTITIONER

## 2025-04-23 RX ORDER — METHYLPREDNISOLONE 4 MG/1
TABLET ORAL
Qty: 21 EACH | Refills: 0 | Status: SHIPPED | OUTPATIENT
Start: 2025-04-23 | End: 2025-05-14

## 2025-04-23 RX ORDER — PROMETHAZINE HYDROCHLORIDE AND DEXTROMETHORPHAN HYDROBROMIDE 6.25; 15 MG/5ML; MG/5ML
5 SYRUP ORAL EVERY 4 HOURS PRN
Qty: 473 ML | Refills: 0 | Status: SHIPPED | OUTPATIENT
Start: 2025-04-23

## 2025-04-23 NOTE — LETTER
April 23, 2025      [facility]  [address] [facility] [place] [address]  Phone: [phone]  Fax: [phone]       Patient: Taisha Rivera   YOB: 1990  Date of Visit: 04/23/2025    To Whom It May Concern:    Demar Rivera  was at [facility] on 04/23/2025. The patient may return to work/school on 04/25/2025 with no restrictions. If you have any questions or concerns, or if I can be of further assistance, please do not hesitate to contact me.    Sincerely,    AVA Ha

## 2025-04-23 NOTE — PROGRESS NOTES
Subjective     Patient ID: Taisha iRvera is a 35 y.o. female.    Chief Complaint: Sinusitis (Congestion/Cough x 3 days)    Pt presents with sinus symptoms x 3 days.       Review of Systems   Constitutional:  Negative for activity change, appetite change, chills, fatigue and fever.   HENT:  Positive for nasal congestion, rhinorrhea and sinus pressure/congestion. Negative for ear discharge, nosebleeds, postnasal drip, sneezing, sore throat and tinnitus.    Eyes:  Negative for pain, discharge, redness and itching.   Respiratory:  Positive for cough. Negative for choking, chest tightness, shortness of breath and wheezing.    Cardiovascular:  Negative for chest pain.   Gastrointestinal:  Negative for abdominal distention, abdominal pain, blood in stool, change in bowel habit, constipation, diarrhea, nausea and vomiting.   Genitourinary:  Negative for decreased urine volume, dysuria, flank pain, frequency, menstrual irregularity and menstrual problem.   Musculoskeletal:  Negative for back pain and gait problem.   Integumentary:  Negative for wound, breast mass and breast discharge.   Allergic/Immunologic: Negative for immunocompromised state.   Neurological:  Negative for dizziness, light-headedness and headaches.   Psychiatric/Behavioral:  Negative for agitation, behavioral problems and hallucinations.    Breast: Negative for mass         Objective     Physical Exam  Vitals and nursing note reviewed.   Constitutional:       Appearance: Normal appearance.   HENT:      Head: Normocephalic.      Right Ear: Tympanic membrane normal.      Left Ear: Tympanic membrane normal.      Nose: Congestion present.      Mouth/Throat:      Mouth: Mucous membranes are moist.   Eyes:      Conjunctiva/sclera: Conjunctivae normal.   Cardiovascular:      Rate and Rhythm: Normal rate and regular rhythm.      Heart sounds: Normal heart sounds.   Pulmonary:      Effort: Pulmonary effort is normal.      Breath sounds: Normal breath  sounds.   Musculoskeletal:         General: Normal range of motion.   Neurological:      Mental Status: She is alert and oriented to person, place, and time.   Psychiatric:         Mood and Affect: Mood normal.         Behavior: Behavior normal.            Assessment and Plan     1. Viral illness  -     methylPREDNISolone (MEDROL DOSEPACK) 4 mg tablet; use as directed  Dispense: 21 each; Refill: 0  -     promethazine-dextromethorphan (PROMETHAZINE-DM) 6.25-15 mg/5 mL Syrp; Take 5 mLs by mouth every 4 (four) hours as needed (cough). Do not take while driving  Dispense: 473 mL; Refill: 0        Notify clinic if symptoms worsen or persist.          Follow up if symptoms worsen or fail to improve.

## 2025-04-25 ENCOUNTER — OFFICE VISIT (OUTPATIENT)
Facility: CLINIC | Age: 35
End: 2025-04-25
Payer: COMMERCIAL

## 2025-04-25 VITALS
OXYGEN SATURATION: 96 % | BODY MASS INDEX: 31.41 KG/M2 | DIASTOLIC BLOOD PRESSURE: 65 MMHG | WEIGHT: 184 LBS | RESPIRATION RATE: 18 BRPM | HEART RATE: 107 BPM | SYSTOLIC BLOOD PRESSURE: 108 MMHG | HEIGHT: 64 IN | TEMPERATURE: 98 F

## 2025-04-25 DIAGNOSIS — E66.811 OBESITY (BMI 30.0-34.9): ICD-10-CM

## 2025-04-25 DIAGNOSIS — Z30.42 SURVEILLANCE OF CONTRACEPTIVE INJECTION: Primary | ICD-10-CM

## 2025-04-25 PROCEDURE — 99213 OFFICE O/P EST LOW 20 MIN: CPT | Mod: 25,,, | Performed by: ADVANCED PRACTICE MIDWIFE

## 2025-04-25 PROCEDURE — 96372 THER/PROPH/DIAG INJ SC/IM: CPT | Mod: ,,, | Performed by: ADVANCED PRACTICE MIDWIFE

## 2025-04-25 PROCEDURE — 3074F SYST BP LT 130 MM HG: CPT | Mod: CPTII,,, | Performed by: ADVANCED PRACTICE MIDWIFE

## 2025-04-25 PROCEDURE — 1159F MED LIST DOCD IN RCRD: CPT | Mod: CPTII,,, | Performed by: ADVANCED PRACTICE MIDWIFE

## 2025-04-25 PROCEDURE — 3078F DIAST BP <80 MM HG: CPT | Mod: CPTII,,, | Performed by: ADVANCED PRACTICE MIDWIFE

## 2025-04-25 PROCEDURE — 3008F BODY MASS INDEX DOCD: CPT | Mod: CPTII,,, | Performed by: ADVANCED PRACTICE MIDWIFE

## 2025-04-25 RX ORDER — MEDROXYPROGESTERONE ACETATE 150 MG/ML
150 INJECTION, SUSPENSION INTRAMUSCULAR ONCE
Status: COMPLETED | OUTPATIENT
Start: 2025-04-25 | End: 2025-04-25

## 2025-04-25 RX ADMIN — MEDROXYPROGESTERONE ACETATE 150 MG: 150 INJECTION, SUSPENSION INTRAMUSCULAR at 03:04

## 2025-04-28 NOTE — PROGRESS NOTES
"  Patient ID:  Taisha Rivera is a 35 y.o. female      Chief Complaint:   Chief Complaint   Patient presents with    Contraception     Here for DEPO injection:Last injection 2025  Taking Birth Control: Yes  Sexually Active: Yes   Last Cycle: None due to DEPO  Last PAP: 12/3/2021        HPI:  Taisha Rivera is in for repeat depo injection within dates. Denies ACHES, vag discharge, and abnormal vag bleeding. No issues, problems, or complaints. Desires to continue depo injections.   LMP: No LMP recorded. Patient has had an injection.  Sexually active: yes    Past Medical History:   Diagnosis Date    Anemia     COVID-19     Headache     Hypothyroidism, unspecified     Urinary tract infection without hematuria 2022       History reviewed. No pertinent surgical history.    OB History          3    Para   3    Term                AB        Living             SAB        IAB        Ectopic        Multiple        Live Births                     /65 (BP Location: Left arm, Patient Position: Sitting)   Pulse 107   Temp 98 °F (36.7 °C) (Oral)   Resp 18   Ht 5' 4" (1.626 m)   Wt 83.5 kg (184 lb)   SpO2 96%   BMI 31.58 kg/m²   Wt Readings from Last 3 Encounters:   25 83.5 kg (184 lb)   25 84.4 kg (186 lb)   25 81.6 kg (180 lb)          ROS:  Review of Systems   Constitutional: Negative.    Eyes: Negative.    Respiratory: Negative.     Cardiovascular: Negative.    Gastrointestinal: Negative.    Genitourinary: Negative.    Musculoskeletal: Negative.    Neurological: Negative.    Psychiatric/Behavioral: Negative.            PHYSICAL EXAM:  Physical Exam  Constitutional:       Appearance: Normal appearance. She is obese.   Eyes:      Extraocular Movements: Extraocular movements intact.   Cardiovascular:      Rate and Rhythm: Normal rate.      Pulses: Normal pulses.   Pulmonary:      Effort: Pulmonary effort is normal.   Musculoskeletal:         General: Normal " "range of motion.      Cervical back: Normal range of motion.   Skin:     General: Skin is warm and dry.   Neurological:      Mental Status: She is alert and oriented to person, place, and time.   Psychiatric:         Mood and Affect: Mood normal.         Behavior: Behavior normal.         Thought Content: Thought content normal.         Judgment: Judgment normal.          Assessment:  Surveillance of contraceptive injection  -     medroxyPROGESTERone (DEPO-PROVERA) syringe 150 mg    Obesity (BMI 30.0-34.9)          ICD-10-CM ICD-9-CM    1. Surveillance of contraceptive injection  Z30.42 V25.49 medroxyPROGESTERone (DEPO-PROVERA) syringe 150 mg      2. Obesity (BMI 30.0-34.9)  E66.811 278.00           Plan:  Depo Provera 150 mg IM given, Reviewed "ACHES" of contraceptives and possible S/E  Encouraged Vitamin D and calcium supplements or daily multivitamin  Instructed on condom use during each sexual encounter to decrease STD exposure risk  Encouraged to schedule annual gyn exam with Pap    Follow up in about 3 months (around 7/25/2025) for repeat depo injection.                    "

## 2025-05-06 ENCOUNTER — PATIENT MESSAGE (OUTPATIENT)
Dept: FAMILY MEDICINE | Facility: CLINIC | Age: 35
End: 2025-05-06
Payer: COMMERCIAL

## 2025-08-15 ENCOUNTER — PATIENT MESSAGE (OUTPATIENT)
Facility: CLINIC | Age: 35
End: 2025-08-15
Payer: COMMERCIAL

## 2025-08-15 ENCOUNTER — OFFICE VISIT (OUTPATIENT)
Facility: CLINIC | Age: 35
End: 2025-08-15
Payer: COMMERCIAL

## 2025-08-15 VITALS
WEIGHT: 209 LBS | BODY MASS INDEX: 35.68 KG/M2 | DIASTOLIC BLOOD PRESSURE: 85 MMHG | HEART RATE: 103 BPM | OXYGEN SATURATION: 99 % | SYSTOLIC BLOOD PRESSURE: 131 MMHG | RESPIRATION RATE: 17 BRPM | HEIGHT: 64 IN

## 2025-08-15 DIAGNOSIS — Z30.011 ENCOUNTER FOR INITIAL PRESCRIPTION OF CONTRACEPTIVE PILLS: Primary | ICD-10-CM

## 2025-08-15 DIAGNOSIS — E66.9 OBESITY (BMI 30-39.9): ICD-10-CM

## 2025-08-15 DIAGNOSIS — R10.30 LOWER ABDOMINAL PAIN: ICD-10-CM

## 2025-08-15 LAB
B-HCG UR QL: NEGATIVE
BILIRUB SERPL-MCNC: NEGATIVE MG/DL
BLOOD URINE, POC: NEGATIVE
CLARITY, UA: CLEAR
COLOR, UA: YELLOW
CTP QC/QA: YES
GLUCOSE UR QL STRIP: NEGATIVE
KETONES UR QL STRIP: NEGATIVE
LEUKOCYTE ESTERASE URINE, POC: NEGATIVE
NITRITE, POC UA: NEGATIVE
PH, POC UA: 5.5
PROTEIN, POC: NEGATIVE
SPECIFIC GRAVITY, POC UA: 1.02
UROBILINOGEN, POC UA: 0.2

## 2025-08-15 RX ORDER — NORETHINDRONE 0.35 MG/1
1 TABLET ORAL DAILY
Qty: 28 TABLET | Refills: 12 | Status: SHIPPED | OUTPATIENT
Start: 2025-08-15 | End: 2026-08-15

## 2025-08-15 RX ORDER — DROSPIRENONE 4 MG/1
4 TABLET, FILM COATED ORAL DAILY
Qty: 28 TABLET | Refills: 12 | Status: SHIPPED | OUTPATIENT
Start: 2025-08-15 | End: 2025-08-15 | Stop reason: ALTCHOICE

## 2025-08-18 DIAGNOSIS — R10.2 PELVIC PAIN IN FEMALE: Primary | ICD-10-CM

## 2025-08-19 ENCOUNTER — OFFICE VISIT (OUTPATIENT)
Dept: FAMILY MEDICINE | Facility: CLINIC | Age: 35
End: 2025-08-19
Payer: COMMERCIAL

## 2025-08-19 VITALS
WEIGHT: 210.63 LBS | OXYGEN SATURATION: 100 % | HEIGHT: 64 IN | DIASTOLIC BLOOD PRESSURE: 86 MMHG | SYSTOLIC BLOOD PRESSURE: 126 MMHG | TEMPERATURE: 99 F | HEART RATE: 89 BPM | BODY MASS INDEX: 35.96 KG/M2

## 2025-08-19 DIAGNOSIS — E03.9 HYPOTHYROIDISM, UNSPECIFIED TYPE: ICD-10-CM

## 2025-08-19 LAB
CTP QC/QA: YES
POC (AMP) AMPHETAMINE: NEGATIVE
POC (BAR) BARBITURATES: NEGATIVE
POC (BUP) BUPRENORPHINE: NEGATIVE
POC (BZO) BENZODIAZEPINES: NEGATIVE
POC (COC) COCAINE: NEGATIVE
POC (MDMA) METHYLENEDIOXYMETHAMPHETAMINE 3,4: NEGATIVE
POC (MET) METHAMPHETAMINE: NEGATIVE
POC (MOP) OPIATES: NEGATIVE
POC (MTD) METHADONE: NEGATIVE
POC (OXY) OXYCODONE: NEGATIVE
POC (PCP) PHENCYCLIDINE: NEGATIVE
POC (TCA) TRICYCLIC ANTIDEPRESSANTS: NORMAL
POC TEMPERATURE (URINE): 92
POC THC: NEGATIVE

## 2025-08-19 PROCEDURE — 1160F RVW MEDS BY RX/DR IN RCRD: CPT | Mod: CPTII,,, | Performed by: NURSE PRACTITIONER

## 2025-08-19 PROCEDURE — 80305 DRUG TEST PRSMV DIR OPT OBS: CPT | Mod: QW,,, | Performed by: NURSE PRACTITIONER

## 2025-08-19 PROCEDURE — 3008F BODY MASS INDEX DOCD: CPT | Mod: CPTII,,, | Performed by: NURSE PRACTITIONER

## 2025-08-19 PROCEDURE — 99213 OFFICE O/P EST LOW 20 MIN: CPT | Mod: ,,, | Performed by: NURSE PRACTITIONER

## 2025-08-19 PROCEDURE — 1159F MED LIST DOCD IN RCRD: CPT | Mod: CPTII,,, | Performed by: NURSE PRACTITIONER

## 2025-08-19 PROCEDURE — 3074F SYST BP LT 130 MM HG: CPT | Mod: CPTII,,, | Performed by: NURSE PRACTITIONER

## 2025-08-19 PROCEDURE — 3079F DIAST BP 80-89 MM HG: CPT | Mod: CPTII,,, | Performed by: NURSE PRACTITIONER

## 2025-08-19 RX ORDER — PHENTERMINE HYDROCHLORIDE 37.5 MG/1
37.5 TABLET ORAL
Qty: 30 TABLET | Refills: 0 | Status: SHIPPED | OUTPATIENT
Start: 2025-08-19 | End: 2025-09-18